# Patient Record
Sex: FEMALE | Race: WHITE | NOT HISPANIC OR LATINO | Employment: OTHER | URBAN - METROPOLITAN AREA
[De-identification: names, ages, dates, MRNs, and addresses within clinical notes are randomized per-mention and may not be internally consistent; named-entity substitution may affect disease eponyms.]

---

## 2017-01-01 ENCOUNTER — TRANSFERRED RECORDS (OUTPATIENT)
Dept: HEALTH INFORMATION MANAGEMENT | Facility: CLINIC | Age: 82
End: 2017-01-01

## 2017-01-01 ENCOUNTER — NURSING HOME VISIT (OUTPATIENT)
Dept: GERIATRICS | Facility: CLINIC | Age: 82
End: 2017-01-01
Payer: COMMERCIAL

## 2017-01-01 ENCOUNTER — CLINICAL UPDATE (OUTPATIENT)
Dept: PHARMACY | Facility: CLINIC | Age: 82
End: 2017-01-01

## 2017-01-01 ENCOUNTER — DOCUMENTATION ONLY (OUTPATIENT)
Dept: OTHER | Facility: CLINIC | Age: 82
End: 2017-01-01

## 2017-01-01 ENCOUNTER — TELEPHONE (OUTPATIENT)
Dept: GERIATRICS | Facility: CLINIC | Age: 82
End: 2017-01-01

## 2017-01-01 VITALS
OXYGEN SATURATION: 94 % | RESPIRATION RATE: 18 BRPM | DIASTOLIC BLOOD PRESSURE: 67 MMHG | HEART RATE: 65 BPM | BODY MASS INDEX: 27.64 KG/M2 | WEIGHT: 161 LBS | TEMPERATURE: 97.8 F | SYSTOLIC BLOOD PRESSURE: 108 MMHG

## 2017-01-01 VITALS
RESPIRATION RATE: 16 BRPM | WEIGHT: 158 LBS | BODY MASS INDEX: 27.12 KG/M2 | OXYGEN SATURATION: 98 % | SYSTOLIC BLOOD PRESSURE: 133 MMHG | DIASTOLIC BLOOD PRESSURE: 69 MMHG | TEMPERATURE: 98 F | HEART RATE: 70 BPM

## 2017-01-01 VITALS
WEIGHT: 160 LBS | BODY MASS INDEX: 27.46 KG/M2 | HEART RATE: 68 BPM | DIASTOLIC BLOOD PRESSURE: 86 MMHG | RESPIRATION RATE: 16 BRPM | TEMPERATURE: 98 F | OXYGEN SATURATION: 98 % | SYSTOLIC BLOOD PRESSURE: 134 MMHG

## 2017-01-01 VITALS
BODY MASS INDEX: 27.29 KG/M2 | RESPIRATION RATE: 18 BRPM | TEMPERATURE: 97.3 F | WEIGHT: 159 LBS | HEART RATE: 91 BPM | OXYGEN SATURATION: 96 % | DIASTOLIC BLOOD PRESSURE: 73 MMHG | SYSTOLIC BLOOD PRESSURE: 110 MMHG

## 2017-01-01 VITALS
HEART RATE: 76 BPM | BODY MASS INDEX: 26.95 KG/M2 | WEIGHT: 157 LBS | RESPIRATION RATE: 18 BRPM | SYSTOLIC BLOOD PRESSURE: 111 MMHG | TEMPERATURE: 97.6 F | DIASTOLIC BLOOD PRESSURE: 71 MMHG | OXYGEN SATURATION: 97 %

## 2017-01-01 VITALS
WEIGHT: 159 LBS | DIASTOLIC BLOOD PRESSURE: 69 MMHG | TEMPERATURE: 98 F | HEART RATE: 73 BPM | BODY MASS INDEX: 27.29 KG/M2 | OXYGEN SATURATION: 98 % | SYSTOLIC BLOOD PRESSURE: 133 MMHG | RESPIRATION RATE: 16 BRPM

## 2017-01-01 VITALS
BODY MASS INDEX: 26.78 KG/M2 | OXYGEN SATURATION: 96 % | DIASTOLIC BLOOD PRESSURE: 66 MMHG | TEMPERATURE: 97.8 F | RESPIRATION RATE: 16 BRPM | HEART RATE: 71 BPM | SYSTOLIC BLOOD PRESSURE: 144 MMHG | WEIGHT: 156 LBS

## 2017-01-01 VITALS
HEART RATE: 66 BPM | DIASTOLIC BLOOD PRESSURE: 69 MMHG | WEIGHT: 157 LBS | TEMPERATURE: 97.3 F | BODY MASS INDEX: 26.8 KG/M2 | OXYGEN SATURATION: 98 % | SYSTOLIC BLOOD PRESSURE: 112 MMHG | HEIGHT: 64 IN | RESPIRATION RATE: 20 BRPM

## 2017-01-01 VITALS
HEART RATE: 53 BPM | BODY MASS INDEX: 26.78 KG/M2 | DIASTOLIC BLOOD PRESSURE: 74 MMHG | TEMPERATURE: 97.5 F | OXYGEN SATURATION: 93 % | RESPIRATION RATE: 16 BRPM | WEIGHT: 156 LBS | SYSTOLIC BLOOD PRESSURE: 122 MMHG

## 2017-01-01 VITALS
HEART RATE: 65 BPM | RESPIRATION RATE: 18 BRPM | SYSTOLIC BLOOD PRESSURE: 108 MMHG | OXYGEN SATURATION: 94 % | BODY MASS INDEX: 27.64 KG/M2 | WEIGHT: 161 LBS | TEMPERATURE: 97.8 F | DIASTOLIC BLOOD PRESSURE: 67 MMHG

## 2017-01-01 VITALS
OXYGEN SATURATION: 96 % | BODY MASS INDEX: 26.76 KG/M2 | HEART RATE: 70 BPM | DIASTOLIC BLOOD PRESSURE: 64 MMHG | TEMPERATURE: 97.6 F | SYSTOLIC BLOOD PRESSURE: 164 MMHG | RESPIRATION RATE: 18 BRPM | WEIGHT: 156 LBS

## 2017-01-01 VITALS
TEMPERATURE: 97.6 F | DIASTOLIC BLOOD PRESSURE: 64 MMHG | OXYGEN SATURATION: 96 % | RESPIRATION RATE: 18 BRPM | WEIGHT: 158 LBS | HEART RATE: 70 BPM | SYSTOLIC BLOOD PRESSURE: 154 MMHG | BODY MASS INDEX: 27.12 KG/M2

## 2017-01-01 VITALS
TEMPERATURE: 97.7 F | SYSTOLIC BLOOD PRESSURE: 145 MMHG | OXYGEN SATURATION: 95 % | DIASTOLIC BLOOD PRESSURE: 65 MMHG | RESPIRATION RATE: 18 BRPM | BODY MASS INDEX: 27.46 KG/M2 | HEART RATE: 79 BPM | WEIGHT: 160 LBS

## 2017-01-01 DIAGNOSIS — R54 FRAIL ELDERLY: ICD-10-CM

## 2017-01-01 DIAGNOSIS — F02.80 ALZHEIMER'S DISEASE OF OTHER ONSET: ICD-10-CM

## 2017-01-01 DIAGNOSIS — H91.93 HOH (HARD OF HEARING), BILATERAL: ICD-10-CM

## 2017-01-01 DIAGNOSIS — H54.7 BLIND: ICD-10-CM

## 2017-01-01 DIAGNOSIS — H54.7 BLIND: Primary | ICD-10-CM

## 2017-01-01 DIAGNOSIS — I10 ESSENTIAL HYPERTENSION: ICD-10-CM

## 2017-01-01 DIAGNOSIS — G30.9 ALZHEIMER'S DEMENTIA WITHOUT BEHAVIORAL DISTURBANCE, UNSPECIFIED TIMING OF DEMENTIA ONSET: Primary | ICD-10-CM

## 2017-01-01 DIAGNOSIS — I10 BENIGN ESSENTIAL HYPERTENSION: ICD-10-CM

## 2017-01-01 DIAGNOSIS — I48.91 ATRIAL FIBRILLATION, UNSPECIFIED TYPE (H): Primary | ICD-10-CM

## 2017-01-01 DIAGNOSIS — I20.9 ANGINA PECTORIS (H): ICD-10-CM

## 2017-01-01 DIAGNOSIS — R42 DIZZINESS: ICD-10-CM

## 2017-01-01 DIAGNOSIS — Z79.01 CHRONIC ANTICOAGULATION: ICD-10-CM

## 2017-01-01 DIAGNOSIS — Z79.01 LONG TERM (CURRENT) USE OF ANTICOAGULANTS: ICD-10-CM

## 2017-01-01 DIAGNOSIS — M62.81 GENERALIZED MUSCLE WEAKNESS: ICD-10-CM

## 2017-01-01 DIAGNOSIS — Z71.89 ADVANCED DIRECTIVES, COUNSELING/DISCUSSION: Chronic | ICD-10-CM

## 2017-01-01 DIAGNOSIS — M25.551 PAIN OF RIGHT HIP JOINT: ICD-10-CM

## 2017-01-01 DIAGNOSIS — B37.2 YEAST INFECTION OF THE SKIN: Primary | ICD-10-CM

## 2017-01-01 DIAGNOSIS — I48.91 ATRIAL FIBRILLATION, UNSPECIFIED TYPE (H): ICD-10-CM

## 2017-01-01 DIAGNOSIS — N62 HYPERTROPHY OF BREAST: ICD-10-CM

## 2017-01-01 DIAGNOSIS — F41.9 ANXIETY: ICD-10-CM

## 2017-01-01 DIAGNOSIS — F02.80 ALZHEIMER'S DEMENTIA WITHOUT BEHAVIORAL DISTURBANCE, UNSPECIFIED TIMING OF DEMENTIA ONSET: ICD-10-CM

## 2017-01-01 DIAGNOSIS — I48.20 CHRONIC ATRIAL FIBRILLATION (H): Primary | ICD-10-CM

## 2017-01-01 DIAGNOSIS — G30.8 ALZHEIMER'S DISEASE OF OTHER ONSET: ICD-10-CM

## 2017-01-01 DIAGNOSIS — G30.9 ALZHEIMER'S DEMENTIA WITHOUT BEHAVIORAL DISTURBANCE, UNSPECIFIED TIMING OF DEMENTIA ONSET: ICD-10-CM

## 2017-01-01 DIAGNOSIS — N62 HYPERTROPHY OF BREAST: Primary | ICD-10-CM

## 2017-01-01 DIAGNOSIS — H25.10 SENILE NUCLEAR SCLEROSIS, UNSPECIFIED LATERALITY: ICD-10-CM

## 2017-01-01 DIAGNOSIS — F02.80 ALZHEIMER'S DEMENTIA WITHOUT BEHAVIORAL DISTURBANCE, UNSPECIFIED TIMING OF DEMENTIA ONSET: Primary | ICD-10-CM

## 2017-01-01 DIAGNOSIS — R53.1 WEAKNESS: ICD-10-CM

## 2017-01-01 DIAGNOSIS — M79.89 SWELLING OF LIMB: ICD-10-CM

## 2017-01-01 DIAGNOSIS — I48.20 CHRONIC ATRIAL FIBRILLATION (H): ICD-10-CM

## 2017-01-01 LAB
ANION GAP SERPL CALCULATED.3IONS-SCNC: 11 MMOL/L (ref 5–18)
BUN SERPL-MCNC: 12 MG/DL (ref 8–28)
CALCIUM SERPL-MCNC: 9 MG/DL (ref 8.5–10.5)
CHLORIDE SERPLBLD-SCNC: 104 MMOL/L (ref 98–107)
CO2 SERPL-SCNC: 26 MMOL/L (ref 22–31)
CREAT SERPL-MCNC: 0.86 MG/DL (ref 0.6–1.1)
GFR SERPL CREATININE-BSD FRML MDRD: >60 ML/MIN/1.73M2
GLUCOSE SERPL-MCNC: 77 MG/DL (ref 70–125)
HEMOGLOBIN: 13.4 G/DL (ref 12–16)
HEMOGLOBIN: 13.8 G/DL (ref 12–16)
INR PPP: 1.96 (ref 0.9–1.1)
INR PPP: 2.47 (ref 0.9–1.1)
INR PPP: 2.62 (ref 0.9–1.1)
INR PPP: 2.62 (ref 0.9–1.1)
POTASSIUM SERPL-SCNC: 3.9 MMOL/L (ref 3.5–5)
SODIUM SERPL-SCNC: 141 MMOL/L (ref 136–145)
TSH SERPL-ACNC: 0.3 UIU/ML (ref 0.3–5)

## 2017-01-01 PROCEDURE — 99309 SBSQ NF CARE MODERATE MDM 30: CPT | Performed by: NURSE PRACTITIONER

## 2017-01-01 PROCEDURE — 99207 ZZC CDG-CORRECTLY CODED, REVIEWED AND AGREE: CPT | Performed by: FAMILY MEDICINE

## 2017-01-01 PROCEDURE — 99207 ZZC CDG-CORRECTLY CODED, REVIEWED AND AGREE: CPT | Performed by: NURSE PRACTITIONER

## 2017-01-01 PROCEDURE — 99310 SBSQ NF CARE HIGH MDM 45: CPT | Performed by: FAMILY MEDICINE

## 2017-01-01 PROCEDURE — 99308 SBSQ NF CARE LOW MDM 20: CPT | Performed by: NURSE PRACTITIONER

## 2017-01-01 PROCEDURE — 99309 SBSQ NF CARE MODERATE MDM 30: CPT | Performed by: FAMILY MEDICINE

## 2017-01-01 PROCEDURE — 99318 ZZC ANNUAL NURSING FAC ASSESSMNT, STABLE: CPT | Performed by: NURSE PRACTITIONER

## 2017-01-01 RX ORDER — NYSTATIN 100000 [USP'U]/G
POWDER TOPICAL 2 TIMES DAILY PRN
Qty: 60 G | Refills: 5 | Status: SHIPPED | OUTPATIENT
Start: 2017-01-01 | End: 2018-01-01

## 2017-01-01 RX ORDER — BRIMONIDINE TARTRATE 2 MG/ML
1 SOLUTION/ DROPS OPHTHALMIC 2 TIMES DAILY
Qty: 1 BOTTLE | Refills: 11 | Status: SHIPPED | OUTPATIENT
Start: 2017-01-01

## 2017-01-01 RX ORDER — OLOPATADINE HYDROCHLORIDE 1 MG/ML
1 SOLUTION/ DROPS OPHTHALMIC 2 TIMES DAILY PRN
COMMUNITY

## 2017-01-05 VITALS
OXYGEN SATURATION: 98 % | WEIGHT: 159 LBS | DIASTOLIC BLOOD PRESSURE: 79 MMHG | SYSTOLIC BLOOD PRESSURE: 161 MMHG | BODY MASS INDEX: 27.28 KG/M2 | TEMPERATURE: 97.7 F | RESPIRATION RATE: 18 BRPM | HEART RATE: 60 BPM

## 2017-01-05 NOTE — PROGRESS NOTES
[place] [facility]    Chief Complaint   Patient presents with     [facility]I Regulatory       HPI:   Obtained from the patient, medical record and from the [facility] staffs.     Francisco Mason is a 89 year old  (6/18/1920), who is being seen today for a federally mandated E/M visit at [facility] . Today's concerns are:  Atrial fibrillation, unspecified type (H)  - Denies CP, SOB or palpitation  - Had CP episode las month, family and pt opted for conservative management at the NH, started on NTG prn, with good result. .       Benign essential hypertension  - No CP, HA or fainting      Osteoarthritis, unspecified osteoarthritis type, unspecified site  - Reports aching chronic pain in right hip, 7/10 when severe, aggravated with excessive movements, but better with rest and pain medications.   - Transfer self to  and a walker.    Alzheimer's disease of other onset  - has a private room. No  Behavioral issue reported.   - Moderate, and is able to make some needs known. Some care cares must be anticipated        ALLERGIES: Iodine; Levaquin; Penicillins; Plasticized base; and Sulfa drugs  PAST MEDICAL HISTORY:  has a past medical history of Memory loss (6/2/2014); Unsteady gait (6/2/2014); A-fib (H); Unspecified essential hypertension; Weakness; Falls; Cataract; Vision problems; and Puncture wound of ear drum, right (1/19/2015).  PAST SURGICAL HISTORY:  has past surgical history that includes Eye surgery and Phacoemulsification with standard intraocular lens implant (Right, 10/6/2014).  FAMILY HISTORY: family history is not on file.  SOCIAL HISTORY:  reports that she has never smoked. She has never used smokeless tobacco. She reports that she does not drink alcohol or use illicit drugs.    MEDICATIONS:  Current Outpatient Prescriptions   Medication Sig Dispense Refill     warfarin (COUMADIN) 1 MG tablet Take 1 mg by mouth As directed per INR       phenol (CHLORASEPTIC) 1.4 % LIQD Take 2 sprays  by mouth every 3 hours as needed for sore throat       nystatin (MYCOSTATIN) 086820 UNIT/GM POWD Apply topically 2 times daily as needed        Artificial Tear Ointment (LACRI-LUBE OP) Place 1 Application into the right eye At Bedtime       polyethylene glycol (MIRALAX/GLYCOLAX) packet Take 1 packet by mouth daily       LISINOPRIL PO Take 10 mg by mouth daily        VITAMIN D, CHOLECALCIFEROL, PO Take 1,000 Units by mouth daily       senna-docusate (SENOKOT-S;PERICOLACE) 8.6-50 MG per tablet Take 3 tablets by mouth At Bedtime        diltiazem (DILT-XR) 120 MG 24 hr ER capsule Take 120 mg by mouth daily       brimonidine (ALPHAGAN) 0.2 % ophthalmic solution Place 2 drops into the right eye 2 times daily        Acetaminophen (TYLENOL PO) Take 1,000 mg by mouth 2 times daily        Medications reviewed:  Medications reconciled to facility chart and changes were made to reflect current medications as identified as above med list. Below are the changes that were made:   Medications stopped since last EPIC medication reconciliation:   There are no discontinued medications.    Medications started since last [facility] medication reconciliation:  No orders of the defined types were placed in this encounter.         Case Management:  I have reviewed the care plan and MDS and do agree with the plan. Patient's desire to return to the community is not present.  Information reviewed:  Medications, vital signs, orders, and nursing notes.    ROS:  10 point ROS of systems including Constitutional, Eyes, Respiratory, Cardiovascular, Gastroenterology, Genitourinary, Integumentary, Muscularskeletal, Psychiatric were all negative except for pertinent positives noted in my HPI.    Exam:  /79 mmHg  Pulse 60  Temp(Src) 97.7  F (36.5  C)  Resp 18  Wt 159 lb (72.122 kg)  SpO2 98%    GENERAL APPEARANCE:  Alert, in no distress, thin  ENT:  Mouth and posterior oropharynx normal, moist mucous.[place] membranes, lost some teeth mainly on the  top level  EYES:  EOM, conjunctivae, lids, pupils and irises normal. LOST VISUAL ACUITY IN LEFT EYE.    NECK:  No adenopathy,masses or thyromegaly  RESP:  respiratory effort and palpation of chest normal, lungs clear to auscultation , no respiratory distress  CV:  Palpation and auscultation of heart done , irregular rate  no murmur, rub, or gallop, no edema  ABDOMEN:  normal bowel sounds, soft, nontender, no hepatosplenomegaly or other masses  M/S:   Gait and station abnormal, uses WC and a Rolator.   SKIN:  Inspection of skin and subcutaneous tissue baseline, Palpation of skin and subcutaneous tissue baseline  NEURO:   Cranial nerves 2-12 are normal tested and grossly at patient's baseline  PSYCH:  oriented to name, day of the week, not the year (missed by one year), and the month.  Judgement is intact, memory affected.        Lab/Diagnostic data:  All labs reviewed, none recent    ASSESSMENT/PLAN  Atrial fibrillation, unspecified type (H)  -  on coumadin with INR followed closely  - On Diltiazem  mg ER., CVR, clinically stable.     Benign essential hypertension   BP Readings from Last 3 Encounters:   01/05/17 161/79   12/05/16 124/72   11/05/16 132/67   -  On Lisinopril 10 mg daily, controlled.   - Keep SBP> 130 mmHg and DBP > 65 mmHg (levels below these increase mortality as shown by standard studies and observations).       Osteoarthritis, unspecified osteoarthritis type, unspecified site  - Chronic, analgesia optimal    Alzheimer's disease of other onset  - Continue to anticipate needs. Chronic condition, ongoing decline expected.   -  Continue to provide redirection and reassurance as needed. Maintain safe living situation with goals focused on comfort.    Frailty:  - Significant  Deficits requiring NH placement  Requiring extensive assistance from nursing  Up for meals only o/w spends the day resting in bed          Orders:  - The current medical regimen is effective;  continue present plan and  medications.      Total time spent with patient visit was 35 min including patient visit and review of past records..    Electronically signed by:  Erma Barrientos MD

## 2017-01-09 ENCOUNTER — NURSING HOME VISIT (OUTPATIENT)
Dept: GERIATRICS | Facility: CLINIC | Age: 82
End: 2017-01-09
Payer: COMMERCIAL

## 2017-01-09 DIAGNOSIS — I10 BENIGN ESSENTIAL HYPERTENSION: ICD-10-CM

## 2017-01-09 DIAGNOSIS — G30.8 ALZHEIMER'S DISEASE OF OTHER ONSET: ICD-10-CM

## 2017-01-09 DIAGNOSIS — R54 FRAIL ELDERLY: ICD-10-CM

## 2017-01-09 DIAGNOSIS — M19.90 OSTEOARTHRITIS, UNSPECIFIED OSTEOARTHRITIS TYPE, UNSPECIFIED SITE: ICD-10-CM

## 2017-01-09 DIAGNOSIS — I48.91 ATRIAL FIBRILLATION, UNSPECIFIED TYPE (H): Primary | ICD-10-CM

## 2017-01-09 DIAGNOSIS — F02.80 ALZHEIMER'S DISEASE OF OTHER ONSET: ICD-10-CM

## 2017-01-09 PROCEDURE — 99207 ZZC CDG-CORRECTLY CODED, REVIEWED AND AGREE: CPT | Performed by: FAMILY MEDICINE

## 2017-01-09 PROCEDURE — 99310 SBSQ NF CARE HIGH MDM 45: CPT | Performed by: FAMILY MEDICINE

## 2017-02-08 NOTE — PROGRESS NOTES
"[place] [facility]    Chief Complaint   Patient presents with     Nursing Home Acute       HPI:    Francisco Mason is a 89 year old  (6/18/1920), who is being seen today for an episodic care visit at [facility] . Today's concern is:  Atrial fibrillation, unspecified type (H)  On chronic coumadin   No CP/SOB, HA    Hip pain  Chronic but worse lately  Ambulated independently with walker previously now relies on w/c  Patient states \"you can't expect me to keep it all up.  I'm blind\"  Discussed hip pain with patient who states \"of course I hurt\"  Nursing believes the hip pain limits mobilty  Alzheimer's disease of other onset  With physical and functional decline and Significant deficits requiring NH placement  Complicates nursing care and patients ability to follow and understand POC.  Needs private room r/t aggression toward roommates   Anxiety  Managed with environmental changes.  Reclusive.  Worse lately r/t son being gone to [facility] (hard of hearing)   Limits communication.  Able to participate with conversation with quiet environment and 1:1 attention.  Blind  Followed by eye clinic   Limits independence  ALLERGIES: Iodine; Levaquin [levofloxacin]; Penicillins; Plasticized base [bht-mo-polyethylene]; and Sulfa drugs  Past Medical, Surgical, Family and Social History reviewed and updated in [facility].    Current Outpatient Prescriptions   Medication Sig Dispense Refill     Oseltamivir Phosphate (TAMIFLU PO) Take 75 mg by mouth daily       Acetaminophen (TYLENOL PO) Take 1,000 mg by mouth every 6 hours as needed for mild pain or fever       brimonidine (ALPHAGAN) 0.2 % ophthalmic solution Place 1 drop into the right eye 2 times daily 1 Bottle 11     warfarin (COUMADIN) 1 MG tablet Take 1 mg by mouth As directed per INR       phenol (CHLORASEPTIC) 1.4 % LIQD Take 2 sprays by mouth every 3 hours as needed for sore throat       nystatin (MYCOSTATIN) 025208 UNIT/GM POWD Apply topically 2 times " daily as needed        Artificial Tear Ointment (LACRI-LUBE OP) Place 1 Application into the right eye At Bedtime       polyethylene glycol (MIRALAX/GLYCOLAX) packet Take 1 packet by mouth daily       LISINOPRIL PO Take 10 mg by mouth daily        VITAMIN D, CHOLECALCIFEROL, PO Take 1,000 Units by mouth daily       senna-docusate (SENOKOT-S;PERICOLACE) 8.6-50 MG per tablet Take 3 tablets by mouth At Bedtime        diltiazem (DILT-XR) 120 MG 24 hr ER capsule Take 120 mg by mouth daily       Acetaminophen (TYLENOL PO) Take 1,000 mg by mouth 2 times daily        Medications reviewed:  Medications reconciled to facility chart and changes were made to reflect current medications as identified as above med list. Below are the changes that were made:   Medications stopped since last EPIC medication reconciliation:   There are no discontinued medications.    Medications started since last [facility] medication reconciliation:  Orders Placed This Encounter   Medications     Oseltamivir Phosphate (TAMIFLU PO)     Sig: Take 75 mg by mouth daily     Acetaminophen (TYLENOL PO)     Sig: Take 1,000 mg by mouth every 6 hours as needed for mild pain or fever         REVIEW OF SYSTEMS:  Unobtainable secondary to cognitive impairment or aphasia. and 4 point ROS including Respiratory, CV, GI and , other than that noted in the HPI,  is negative    Physical Exam:  /64  Pulse 70  Temp 97.6  F (36.4  C)  Resp 18  Wt 156 lb (70.8 kg)  SpO2 96%  BMI 26.78 kg/m2  GENERAL APPEARANCE:  Alert, in no distress, cooperative  ENT:  [place], oral mucous membranes moist  EYES:  EOM normal, conjunctiva and lids normal  RESP:  lungs clear to auscultation , no respiratory distress, diminished breath sounds bases  CV:  regular rate and rhythm, no murmur, rub, or gallop, no edema  ABDOMEN:  bowel sounds normal, soft, non-tender  M/S:   Gait and station abnormal up at side of bed at present HAYS thin frail kyphotic  SKIN:  pale w/d  PSYCH:  oriented to self,  insight and judgement impaired, memory impaired , affect and mood normal      INR 2.3 on 3 mg coumadin    Assessment/Plan:     Atrial fibrillation, unspecified type (H)  Hip pain  Alzheimer's disease of other onset  Anxiety  [place] (hard of hearing)  Blind      Orders:  Xray right hip  Continue same dose coumadin and check INR one month   Offer tylenol as patient allows    Time  Total time spent with patient visit was 25 min including patient visit and review of past records. Greater than 50% of total time spent with counseling and coordinating care.      Electronically signed by  EMILY Beltran CNP

## 2017-03-08 NOTE — PROGRESS NOTES
[place] [facility]    Chief Complaint   Patient presents with     [facility] Regulatory       HPI:    Francisco Mason is a 89 year old  (6/18/1920), who is being seen today for a federally mandated E/M visit at [facility] . Today's concerns are:  {S DX:629313}    ALLERGIES: Iodine; Levaquin [levofloxacin]; Penicillins; Plasticized base [bht-mo-polyethylene]; and Sulfa drugs  PAST MEDICAL HISTORY:  has a past medical history of A-fib (H); Cataract; Falls; Memory loss (6/2/2014); Puncture wound of ear drum, right (1/19/2015); Unspecified essential hypertension; Unsteady gait (6/2/2014); Vision problems; and Weakness.  PAST SURGICAL HISTORY:  has a past surgical history that includes Eye surgery and Phacoemulsification with standard intraocular lens implant (Right, 10/6/2014).  FAMILY HISTORY: family history is not on file.  SOCIAL HISTORY:  reports that she has never smoked. She has never used smokeless tobacco. She reports that she does not drink alcohol or use illicit drugs.    MEDICATIONS:  Current Outpatient Prescriptions   Medication Sig Dispense Refill     Acetaminophen (TYLENOL PO) Take 1,000 mg by mouth every 6 hours as needed for mild pain or fever       brimonidine (ALPHAGAN) 0.2 % ophthalmic solution Place 1 drop into the right eye 2 times daily 1 Bottle 11     warfarin (COUMADIN) 1 MG tablet Take 1 mg by mouth As directed per INR       phenol (CHLORASEPTIC) 1.4 % LIQD Take 2 sprays by mouth every 3 hours as needed for sore throat       nystatin (MYCOSTATIN) 202700 UNIT/GM POWD Apply topically 2 times daily as needed        Artificial Tear Ointment (LACRI-LUBE OP) Place 1 Application into the right eye At Bedtime       polyethylene glycol (MIRALAX/GLYCOLAX) packet Take 1 packet by mouth daily       LISINOPRIL PO Take 10 mg by mouth daily        VITAMIN D, CHOLECALCIFEROL, PO Take 1,000 Units by mouth daily       senna-docusate (SENOKOT-S;PERICOLACE) 8.6-50 MG per tablet Take 3  tablets by mouth At Bedtime        diltiazem (DILT-XR) 120 MG 24 hr ER capsule Take 120 mg by mouth daily       Acetaminophen (TYLENOL PO) Take 1,000 mg by mouth 2 times daily        Medications reviewed:  Medications reconciled to facility chart and changes were made to reflect current medications as identified as above med list. Below are the changes that were made:   Medications stopped since last EPIC medication reconciliation:   There are no discontinued medications.    Medications started since last [facility] medication reconciliation:  No orders of the defined types were placed in this encounter.        Case Management:  I have reviewed the care plan and MDS and do agree with the plan. Patient's desire to return to the community is {FGS RETURN TO COMMUNITY:610597}.  Information reviewed:  Medications, vital signs, orders, and nursing notes.    ROS:  {ROS FGS:126764}    Exam:  /64  Pulse 70  Temp 97.6  F (36.4  C)  Resp 18  Wt 158 lb (71.7 kg)  SpO2 96%  BMI 27.12 kg/m2  {Nursing home physical exam :601730}    Lab/Diagnostic data:  All labs reviewed, none recent  ASSESSMENT/PLAN  {FGS DX:619035}    Orders:  ***    Total time spent with patient visit was {1/2/3/4/5:350969} including patient visit and review of past records.Greater than 50% of total time spent with counseling and coordinating care.    Electronically signed by:  Eleanor Mckeon

## 2017-03-09 NOTE — PROGRESS NOTES
"[place] [facility]    Chief Complaint   Patient presents with     care home Regulatory       HPI:    Francisco Mason is a 89 year old  (6/18/1920), who is being seen today for a Federally Mandated Regulatory care visit at [facility] . Today's concern is:   Alzheimer's disease of other onset  With physical and functional decline and Significant deficits requiring NH placement   Goal is to emphasize comfort with conservative treatment in NH  Afib  On chronic coumadin.  On Diltiazem with good rate control.  No CP/SOB, HA.   INR therapeutic   Unsteady gait  Ambulates independently with walker  Hx of recurring falls some with injury or fracture   Participates with therapy on prn basis    Benign essential hypertension  Somewhat elevated  per chart and nursing  Discussed with staff and patient .  Patient states \"I don't know what pills I take but I don't want too many\".   BP Readings from Last 3 Encounters:   03/08/17 154/64   02/08/17 164/64   01/05/17 161/79      ALLERGIES: Iodine; Levaquin [levofloxacin]; Penicillins; Plasticized base [bht-mo-polyethylene]; and Sulfa drugs  Past Medical, Surgical, Family and Social History reviewed and updated in "[employer]    Current Outpatient Prescriptions   Medication Sig Dispense Refill     Acetaminophen (TYLENOL PO) Take 1,000 mg by mouth every 6 hours as needed for mild pain or fever       brimonidine (ALPHAGAN) 0.2 % ophthalmic solution Place 1 drop into the right eye 2 times daily 1 Bottle 11     warfarin (COUMADIN) 1 MG tablet Take 1 mg by mouth As directed per INR       phenol (CHLORASEPTIC) 1.4 % LIQD Take 2 sprays by mouth every 3 hours as needed for sore throat       nystatin (MYCOSTATIN) 713895 UNIT/GM POWD Apply topically 2 times daily as needed        Artificial Tear Ointment (LACRI-LUBE OP) Place 1 Application into the right eye At Bedtime       polyethylene glycol (MIRALAX/GLYCOLAX) packet Take 1 packet by mouth daily       LISINOPRIL PO Take 10 " mg by mouth daily        VITAMIN D, CHOLECALCIFEROL, PO Take 1,000 Units by mouth daily       senna-docusate (SENOKOT-S;PERICOLACE) 8.6-50 MG per tablet Take 3 tablets by mouth At Bedtime        diltiazem (DILT-XR) 120 MG 24 hr ER capsule Take 120 mg by mouth daily       Acetaminophen (TYLENOL PO) Take 1,000 mg by mouth 2 times daily        Case Management:  I have reviewed the care plan and MDS and do agree with the plan. Patient's desire to return to the community is present, but is not able due to care needs .  Information reviewed:  Medications, vital signs, orders, and nursing note    REVIEW OF SYSTEMS:  Unobtainable secondary to cognitive impairment or aphasia and 4 point ROS including Respiratory, CV, GI and , other than that noted in the HPI,  is negative    Physical Exam:  Vitals: /64  Pulse 70  Temp 97.6  F (36.4  C)  Resp 18  Wt 158 lb (71.7 kg)  SpO2 96%  BMI 27.12 kg/m2  BMI= Body mass index is 27.12 kg/(m^2).    GENERAL APPEARANCE:  Alert, in no distress, very pleasant and conversant  ENT: , moist mucous membranes, hearing acuity normal  EYES:  EOM, conjunctivae, lids normal  No c/o blurred vision at present   RESP:  respiratory effort of chest normal, no respiratory distress, Lung sounds clear  CV:  auscultation of heart done , rate and rhythm irreg, no  murmur, no rub or gallop, Edema trace  ABDOMEN:  normal bowel sounds, soft, nontender,  M/S:   Gait and station unsteady, good ROM    SKIN:  Pale w/d   PSYCH:  insight and judgement, memory impaired , affect and mood normal    Recent Labs:    Hemoglobin   Date Value Ref Range Status   09/09/2016 13.1 11.7 - 15.7 g/dL Final     Last Basic Metabolic Panel:  NA      141   3/1/2016   POTASSIUM      3.8   3/1/2016  CHLORIDE      104   3/1/2016  DARIELA      9.1   3/1/2016  CO2       28   3/1/2016  BUN       12   3/1/2016  CR     0.83   3/1/2016  GLC       71   3/1/2016     Assessment/Plan:  1. Atrial fibrillation, unspecified type (H)    2. Alzheimer's  disease of other onset    3. Generalized muscle weakness    4. Benign essential hypertension       Orders:    Add lisinopril 2.5 mg po qd o/w the current medical regimen is effective;  continue present plan and medications.    Time  Total time spent with patient visit was 25 min including patient visit, review of past records and phone call to son/Ej Mason/ patient contact. Greater than 50% of total time spent with counseling and coordinating care.    Electronically signed by  EMILY Beltran CNP

## 2017-04-17 NOTE — PROGRESS NOTES
[place] [facility]    Chief Complaint   Patient presents with     Nursing Home Acute       HPI:    Francisco Mason is a 89 year old  (6/18/1920), who is being seen today for an episodic care visit at [facility] . Today's concern is:  Atrial fibrillation, unspecified type (H)  On chronic coumadin  Denies CP/SOB, HA   HR stable  Senile nuclear sclerosis  Blind  Followed by Dr Cormier  Limits activity, independence  Has lacrilube ordered but states she doesn't want it   [place] (hard of hearing), bilateral   Limits communication.  Able to participate with conversation with quiet environment and 1:1 attention.  Alzheimer's disease of other onset  With physical and functional decline and Significant deficits requiring NH placement   Requires private room r/t inability to adjust to any roommate    ALLERGIES: Iodine; Levaquin [levofloxacin]; Penicillins; Plasticized base [bht-mo-polyethylene]; and Sulfa drugs  Past Medical, Surgical, Family and Social History reviewed and updated in [facility].    Current Outpatient Prescriptions   Medication Sig Dispense Refill     nystatin (MYCOSTATIN) 029711 UNIT/GM POWD Apply topically 2 times daily as needed 60 g 5     Acetaminophen (TYLENOL PO) Take 1,000 mg by mouth every 6 hours as needed for mild pain or fever       brimonidine (ALPHAGAN) 0.2 % ophthalmic solution Place 1 drop into the right eye 2 times daily 1 Bottle 11     warfarin (COUMADIN) 1 MG tablet Take 1 mg by mouth As directed per INR       phenol (CHLORASEPTIC) 1.4 % LIQD Take 2 sprays by mouth every 3 hours as needed for sore throat       polyethylene glycol (MIRALAX/GLYCOLAX) packet Take 1 packet by mouth daily       LISINOPRIL PO Take 10 mg by mouth daily        VITAMIN D, CHOLECALCIFEROL, PO Take 1,000 Units by mouth daily       senna-docusate (SENOKOT-S;PERICOLACE) 8.6-50 MG per tablet Take 3 tablets by mouth At Bedtime        diltiazem (DILT-XR) 120 MG 24 hr ER capsule Take 120 mg by mouth daily        Acetaminophen (TYLENOL PO) Take 1,000 mg by mouth 2 times daily        Medications reviewed:  Medications reconciled to facility chart and changes were made to reflect current medications as identified as above med list. Below are the changes that were made:   Medications stopped since last EPIC medication reconciliation:   There are no discontinued medications.    Medications started since last [facility] medication reconciliation:  No orders of the defined types were placed in this encounter.        REVIEW OF SYSTEMS:  4 point ROS including Respiratory, CV, GI and , other than that noted in the HPI,  is negative    Physical Exam:  /86  Pulse 68  Temp 98  F (36.7  C)  Resp 16  Wt 160 lb (72.6 kg)  SpO2 98%  BMI 27.46 kg/m2  GENERAL APPEARANCE:  Alert, in no distress, very pleasant and conversant  ENT: , moist mucous membranes, hearing acuity normal  EYES:  EOM, conjunctivae, lids normal  No c/o blurred vision at present   RESP:  respiratory effort of chest normal, no respiratory distress, Lung sounds clear  CV:  auscultation of heart done , rate and rhythm irreg, no  murmur, no rub or gallop, Edema trace  ABDOMEN:  normal bowel sounds, soft, nontender,  M/S:   Gait and station unsteady, good ROM    SKIN:  Pale w/d   PSYCH:  insight and judgement, memory impaired , affect and mood normal      Recent Labs:     Results for orders placed or performed in visit on 11/03/16   INR   Result Value Ref Range    PT  Seconds    INR 2.17    INR   Result Value Ref Range    PT  Seconds    INR 1.68    INR   Result Value Ref Range    PT  Seconds    INR 2.55    INR   Result Value Ref Range    PT  Seconds    INR 3.36    Hemoglobin   Result Value Ref Range    Hemoglobin 13.1 11.7 - 15.7 g/dL     Last Basic Metabolic Panel:  Lab Results   Component Value Date     03/01/2016      Lab Results   Component Value Date    POTASSIUM 3.8 03/01/2016     Lab Results   Component Value Date    CHLORIDE 104 03/01/2016     Lab  Results   Component Value Date    DARIELA 9.1 03/01/2016     Lab Results   Component Value Date    CO2 28 03/01/2016     Lab Results   Component Value Date    BUN 12 03/01/2016     Lab Results   Component Value Date    CR 0.83 03/01/2016     Lab Results   Component Value Date    GLC 71 03/01/2016       Assessment/Plan:     Atrial fibrillation, unspecified type (H)  Senile nuclear sclerosis  Blind  [place] (hard of hearing), bilateral  Alzheimer's disease of other onset      Orders:  1. DC lacrilube    Time  Total time spent with patient visit was 25 min including patient visit and review of past records. Greater than 50% of total time spent with counseling and coordinating care .       Electronically signed by  EMILY Beltran CNP

## 2017-05-04 NOTE — PROGRESS NOTES
[place] [facility]    Chief Complaint   Patient presents with     residential Regulatory       HPI:   Obtained from the patient, medical record and from the medial staffs.     Francisco Mason is a 89 year old  (6/18/1920), who is being seen today for a federally mandated E/M visit at [facility] . Today's concerns are:  Atrial fibrillation, unspecified type (H)  - Denies CP, SOB or palpitation    Benign essential hypertension  - No CP, HA or fainting      Frail elderly  - continues to requires assistance with ADLs  - Uses a cane  For ambulation, no fall reported by nursing staff    [place] (hard of hearing), bilateral  -  Limits communication.  Able to participate with conversation with quiet environment and 1:1 attention.      Senile nuclear sclerosis, unspecified laterality  - Sees Dr. Cormier.   - Legally blind in left eye. Had a cataract removed from R eye in Oct 2014. Reports ongoing intermittent blurry vision in R eye, s/p posterior capsule opacity Laser surgery.  - issue with the newest eye meds which is not covered by the State.     Alzheimer's disease of other onset  - Able to make some needs known. Some care cares must be anticipated    ________________________________________________  # Past Medical, social, family histories, medications, and allergies reviewed and updated    MEDICATIONS:  Current Outpatient Prescriptions   Medication Sig Dispense Refill     nystatin (MYCOSTATIN) 591826 UNIT/GM POWD Apply topically 2 times daily as needed 60 g 5     Acetaminophen (TYLENOL PO) Take 1,000 mg by mouth every 6 hours as needed for mild pain or fever       brimonidine (ALPHAGAN) 0.2 % ophthalmic solution Place 1 drop into the right eye 2 times daily 1 Bottle 11     warfarin (COUMADIN) 1 MG tablet Take 1 mg by mouth As directed per INR       phenol (CHLORASEPTIC) 1.4 % LIQD Take 2 sprays by mouth every 3 hours as needed for sore throat       polyethylene glycol (MIRALAX/GLYCOLAX) packet  Take 1 packet by mouth daily       LISINOPRIL PO Take 10 mg by mouth daily        VITAMIN D, CHOLECALCIFEROL, PO Take 1,000 Units by mouth daily       senna-docusate (SENOKOT-S;PERICOLACE) 8.6-50 MG per tablet Take 3 tablets by mouth At Bedtime        diltiazem (DILT-XR) 120 MG 24 hr ER capsule Take 120 mg by mouth daily       Acetaminophen (TYLENOL PO) Take 1,000 mg by mouth 2 times daily        Medications reviewed: Reviewed in the chart and EHR.      Case Management:  I have reviewed the care plan and MDS and do agree with the plan. Patient's desire to return to the community is not present.  Information reviewed:  Medications, vital signs, orders, and nursing notes.    ROS:  10 point ROS of systems including Constitutional, Eyes, Respiratory, Cardiovascular, Gastroenterology, Genitourinary, Integumentary, Muscularskeletal, Psychiatric were all negative except for pertinent positives noted in my HPI.    Exam:  Vitals: /69  Pulse 73  Temp 98  F (36.7  C)  Resp 16  Wt 159 lb (72.1 kg)  SpO2 98%  BMI 27.29 kg/m2  BMI= Body mass index is 27.29 kg/(m^2).  GENERAL APPEARANCE:  Alert, in no distress, thin  ENT:  Mouth and posterior oropharynx normal, moist mucous.[place] membranes, lost some teeth mainly on the top level  EYES:  EOM, conjunctivae, lids, pupils and irises normal. LOST VISUAL ACUITY IN LEFT EYE.    NECK:  No adenopathy,masses or thyromegaly  RESP:  respiratory effort and palpation of chest normal, lungs clear to auscultation , no respiratory distress  CV:  Palpation and auscultation of heart done , irregular rate  no murmur, rub, or gallop, no edema  ABDOMEN:  normal bowel sounds, soft, nontender, no hepatosplenomegaly or other masses  M/S:   Gait and station abnormal, uses WC and a cane. Good hand . DIP and PIP nodules noted in both hands.  SKIN:  Inspection of skin and subcutaneous tissue baseline, Palpation of skin and subcutaneous tissue baseline  NEURO:   Cranial nerves 2-12 are normal  tested and grossly at patient's baseline  PSYCH:  AAOx Person, and time  But not place. Judgement is intact, memory affected.      Lab/Diagnostic data:  All labs reviewed, none recent    ASSESSMENT/PLAN  Atrial fibrillation, unspecified type (H)  - on coumadin with INR followed closely  - on diltiazem for rate control    Benign essential hypertension  BP Readings from Last 3 Encounters:   05/04/17 133/69   04/17/17 134/86   03/08/17 154/64   - On lisinopril 10 mg. Also on diltiazem  - Keep SBP> 130 mmHg and DBP > 65 mmHg (levels below these increase mortality as shown by standard studies and observations). Permit SBP up to 160 mmHg given limited age expectance.   - Check BMP ( on lisinopril)    Frail elderly  - Significant  Deficits requiring NH placement. Requiring extensive assistance from nursing. Up for meals only o/w spends the day resting in bed      [place] (hard of hearing), bilateral  -  Limits communication.  Able to participate with conversation with quiet environment and 1:1 attention.      Senile nuclear sclerosis, unspecified laterality  - follows on Dr. Cormier's recommendations.     Alzheimer's disease of other onset  - Continue to anticipate needs. Chronic condition, ongoing decline expected.   -  Continue to provide redirection and reassurance as needed. Maintain safe living situation with goals focused on comfort.    Suggestions:  -  See above.     Total time spent with patient visit at the skilled nursing facility was 35 min including patient visit, discuss with GNP and nursing staffs, review medical records since the Writer's last visit, and labs results.       Electronically signed by:  Erma Barrientos MD

## 2017-05-31 NOTE — PROGRESS NOTES
[place] [facility]    Chief Complaint   Patient presents with     Nursing Home Acute       HPI:    Francisco Mason is a 89 year old  (6/18/1920), who is being seen today for an episodic care visit at [facility] .  HPI information obtained from: facility chart records and facility staff.Today's concern is:  Atrial fibrillation, unspecified type (H)  On chronic coumadin  On diltiazem with good HR control.  Dizziness  Benign essential hypertension  Patient has recurring HTN episodes some with BP Rangng 200s/ 100s   Last episode x 2 this week with patient c/o feeling ill and dizzy with episodes.  No CP/SOB, HA.  Basically blind       ALLERGIES: Iodine; Levaquin [levofloxacin]; Penicillins; Plasticized base [bht-mo-polyethylene]; and Sulfa drugs  Past Medical, Surgical, Family and Social History reviewed and updated in EPIC.    Current Outpatient Prescriptions   Medication Sig Dispense Refill     nystatin (MYCOSTATIN) 641021 UNIT/GM POWD Apply topically 2 times daily as needed 60 g 5     Acetaminophen (TYLENOL PO) Take 1,000 mg by mouth every 6 hours as needed for mild pain or fever       brimonidine (ALPHAGAN) 0.2 % ophthalmic solution Place 1 drop into the right eye 2 times daily 1 Bottle 11     warfarin (COUMADIN) 1 MG tablet Take 1 mg by mouth As directed per INR       phenol (CHLORASEPTIC) 1.4 % LIQD Take 2 sprays by mouth every 3 hours as needed for sore throat       polyethylene glycol (MIRALAX/GLYCOLAX) packet Take 1 packet by mouth daily       LISINOPRIL PO Take 10 mg by mouth daily        VITAMIN D, CHOLECALCIFEROL, PO Take 1,000 Units by mouth daily       senna-docusate (SENOKOT-S;PERICOLACE) 8.6-50 MG per tablet Take 3 tablets by mouth At Bedtime        diltiazem (DILT-XR) 120 MG 24 hr ER capsule Take 120 mg by mouth daily       Acetaminophen (TYLENOL PO) Take 1,000 mg by mouth 2 times daily        Medications reviewed:  Medications reconciled to facility chart and changes were made to  reflect current medications as identified as above med list. Below are the changes that were made:   Medications stopped since last EPIC medication reconciliation:   There are no discontinued medications.    Medications started since last [facility] medication reconciliation:  No orders of the defined types were placed in this encounter.    REVIEW OF SYSTEMS:  4 point ROS including Respiratory, CV, GI and , other than that noted in the HPI,  is negative    Physical Exam:  /69  Pulse 70  Temp 98  F (36.7  C)  Resp 16  Wt 158 lb (71.7 kg)  SpO2 98%  BMI 27.12 kg/m2  GENERAL APPEARANCE:  Alert, in no distress, very pleasant and conversant  ENT: , moist mucous membranes, hearing acuity normal  EYES:  EOM, conjunctivae, lids normal  No c/o blurred vision at present   RESP:  respiratory effort of chest normal, no respiratory distress, Lung sounds clear  CV:  auscultation of heart done , rate and rhythm irreg, no  murmur, no rub or gallop, Edema trace  ABDOMEN:  normal bowel sounds, soft, nontender,  M/S:   Gait and station unsteady, good ROM    SKIN:  Pale w/d   PSYCH:  insight and judgement, memory impaired , affect and mood normal  Recent Labs:  All labs reviewed, none recent    Assessment/Plan:     Atrial fibrillation, unspecified type (H)  Dizziness  Benign essential hypertension      Orders:  1. Increase lisinopril to 20 mg po qd  2. BMP/HGB with next INR  3  Monitor BP/HR qd x 7 days    Total time spent with patient visit was 25 min including patient visit and review of past records. Greater than 50% of total time spent with counseling and coordinating care    Electronically signed by  EMILY Beltran CNP

## 2017-06-02 NOTE — TELEPHONE ENCOUNTER
TELEPHONE ENCOUNTER:      Francisco Masno is a 89 year old  (6/18/1920),Nurse called today to report: patient having tooth extracted today and was supposed to have INR drawn yesterday, unfortunately it was missed.  INR today is 2.28 and he has been stable on Coumadin 3.5 mg daily.       ASSESSMENT/PLAN  Dental extraction while on Coumadin    If dentist does tooth extraction HOLD Coumadin tonight    Give 1 mg both Saturday and Sunday    Recheck INR on Monday 6/5/17    EMILY Morrison CNP

## 2017-06-12 NOTE — PROGRESS NOTES
This patient's medication list and chart were reviewed as part of the service provided by [facility] and [facility]iatric Services.    Assessment/Recommendations:  No recommendations for changes at this time.    Katey Sales, Pharm.D.,[facility]  Board Certified Geriatric Pharmacist  Medication Therapy Management Pharmacist  [phone]

## 2017-07-10 NOTE — PROGRESS NOTES
"  [place] [facility]    Chief Complaint   Patient presents with     long-term Regulatory       HPI:    Francisco Mason is a 89 year old  (6/18/1920), who is being seen today for a federally mandated E/M visit at [facility] .  HPI information obtained from: facility chart records, facility staff and patient report. Today's concerns are:  Atrial fibrillation, unspecified type (H)  On chronic coumadin  Rate controlled today but varies     Angina pectoris (H)  Essential hypertension  Episode of CP without SOB or diaphoresis.  Patient felt weak, dizzy and that \"something wasn't right\".  Goal is to emphasize comfort with conservative treatment in NH  Usually accompanies hypertensive episode and patient had BP reading > 200 over>100.  On call gave extra lisinopril with BP then going down to 160s/80s and resolution of angina.  Discussed nitro patch with patient but she states \"it doesn't happen that often and they take care of it here\"   She is interested in preventing future hypertensive episodes  Weakness   Requiring extensive assistance from nursing  Up for meals only o/w spends the day resting in bed  Blind  Limits independence.  Unable to manage safely independently.  Long term (current) use of anticoagulants    ALLERGIES: Iodine; Levaquin [levofloxacin]; Penicillins; Plasticized base [bht-mo-polyethylene]; and Sulfa drugs  PAST MEDICAL HISTORY:  has a past medical history of A-fib (H); Cataract; Falls; Memory loss (6/2/2014); Puncture wound of ear drum, right (1/19/2015); Unspecified essential hypertension; Unsteady gait (6/2/2014); Vision problems; and Weakness.  PAST SURGICAL HISTORY:  has a past surgical history that includes Eye surgery and Phacoemulsification with standard intraocular lens implant (Right, 10/6/2014).  FAMILY HISTORY: family history is not on file.  SOCIAL HISTORY:  reports that she has never smoked. She has never used smokeless tobacco. She reports that she does not " drink alcohol or use illicit drugs.    MEDICATIONS:  Current Outpatient Prescriptions   Medication Sig Dispense Refill     AmLODIPine Besylate (NORVASC PO) Take 5 mg by mouth daily       nystatin (MYCOSTATIN) 460824 UNIT/GM POWD Apply topically 2 times daily as needed 60 g 5     Acetaminophen (TYLENOL PO) Take 1,000 mg by mouth every 6 hours as needed for mild pain or fever       brimonidine (ALPHAGAN) 0.2 % ophthalmic solution Place 1 drop into the right eye 2 times daily 1 Bottle 11     warfarin (COUMADIN) 1 MG tablet Take 1 mg by mouth As directed per INR       phenol (CHLORASEPTIC) 1.4 % LIQD Take 2 sprays by mouth every 3 hours as needed for sore throat       polyethylene glycol (MIRALAX/GLYCOLAX) packet Take 1 packet by mouth daily       LISINOPRIL PO Take 10 mg by mouth daily        VITAMIN D, CHOLECALCIFEROL, PO Take 1,000 Units by mouth daily       senna-docusate (SENOKOT-S;PERICOLACE) 8.6-50 MG per tablet Take 3 tablets by mouth At Bedtime        diltiazem (DILT-XR) 120 MG 24 hr ER capsule Take 120 mg by mouth daily       Acetaminophen (TYLENOL PO) Take 1,000 mg by mouth 2 times daily        Medications reviewed:  Medications reconciled to facility chart and changes were made to reflect current medications as identified as above med list. Below are the changes that were made:   Medications stopped since last EPIC medication reconciliation:   There are no discontinued medications.    Medications started since last [facility] medication reconciliation:  Orders Placed This Encounter   Medications     AmLODIPine Besylate (NORVASC PO)     Sig: Take 5 mg by mouth daily         Case Management:  I have reviewed the care plan and MDS and do agree with the plan. Patient's desire to return to the community is present, but is not able due to care needs .  Information reviewed:  Medications, vital signs, orders, and nursing notes.    ROS:  4 point ROS including Respiratory, CV, GI and , other than that noted in the HPI,   is negative    Exam:  Vitals: /66  Pulse 71  Temp 97.8  F (36.6  C)  Resp 16  Wt 156 lb (70.8 kg)  SpO2 96%  BMI 26.78 kg/m2  BMI= Body mass index is 26.78 kg/(m^2).  GENERAL APPEARANCE:  Alert, in no distress, very pleasant and conversant  ENT: , moist mucous membranes, hearing acuity normal  EYES:  EOM, conjunctivae, lids normal  No c/o blurred vision at present   RESP:  respiratory effort of chest normal, no respiratory distress, Lung sounds clear  CV:  auscultation of heart done , rate and rhythm irreg, no  murmur, no rub or gallop, Edema trace  ABDOMEN:  normal bowel sounds, soft, nontender,  M/S:   Gait and station unsteady, good ROM    SKIN:  Pale w/d   PSYCH:  insight and judgement, memory impaired , affect and mood normal    Lab/Diagnostic data:    Results for orders placed or performed in visit on 06/02/17   Hemoglobin   Result Value Ref Range    Hemoglobin 13.8 12.0 - 16.0 g/dL    [facility] 06/02/17   Basic metabolic panel   Result Value Ref Range    Sodium 141 136 - 145 mmol/L    Potassium 3.9 3.5 - 5.0 mmol/L    Chloride 104 98 - 107 mmol/L    Carbon Dioxide 26 22 - 31 mmol/L    Anion Gap 11 5 - 18 mmol/L    Glucose 77 70 - 125 mg/dL    Urea Nitrogen 12 8 - 28 mg/dL    Creatinine 0.86 0.60 - 1.10 mg/dL    Calcium 9.0 8.5 - 10.5 mg/dL    GFR Estimate >60 >60 mL/min/1.73m2    GFR Estimate If Black >60 ?60 mL/min/1.73m2    [facility] 06/02/17         ASSESSMENT/PLAN     Atrial fibrillation, unspecified type (H)  Angina pectoris (H)  Essential hypertension  Weakness  Blind  Long term (current) use of anticoagulants      Orders:  1. Norvasc 5 mg po qd  2. BP/HR QOD x 7 days    Total time spent with patient visit was 25 min including patient visit and review of past records. Greater than 50% of total time spent with counseling and coordinating care    Electronically signed by:  EMILY Beltran CNP

## 2017-07-19 NOTE — PROGRESS NOTES
[place] [facility]    Chief Complaint   Patient presents with     RECHECK       HPI:    Francisco Mason is a 89 year old  (6/18/1920), who is being seen today for an episodic care visit at [facility] .  HPI information obtained from: facility chart records, facility staff and patient report.Today's concern is:  Here to see patient at request of nursing    Atrial fibrillation, unspecified type (H)  On chronic coumadin  With goal to continue despite fall risk r/t fear of CVA and decreased function.  Denies CP/SOB, HA .      Angina pectoris (H)  Recurring problem usually coupled with hypertensive episodes  Goal is to emphasize comfort with conservative treatment in NH with prn nitro and O2 if necessary  Patient had hypertensive episode last night with CP but no diaphoresis, SOB.    Essential hypertension  Recurring problems with hypertensive episodes but usually BP/HR ok  ALLERGIES: Iodine; Levaquin [levofloxacin]; Penicillins; Plasticized base [bht-mo-polyethylene]; and Sulfa drugs  Past Medical, Surgical, Family and Social History reviewed and updated in [facility].    Current Outpatient Prescriptions   Medication Sig Dispense Refill     olopatadine (PATANOL) 0.1 % ophthalmic solution Place 1 drop Into the left eye 2 times daily as needed for allergies       AmLODIPine Besylate (NORVASC PO) Take 5 mg by mouth daily       nystatin (MYCOSTATIN) [id number] UNIT/GM POWD Apply topically 2 times daily as needed 60 g 5     Acetaminophen (TYLENOL PO) Take 1,000 mg by mouth every 6 hours as needed for mild pain or fever       brimonidine (ALPHAGAN) 0.2 % ophthalmic solution Place 1 drop into the right eye 2 times daily 1 Bottle 11     warfarin (COUMADIN) 1 MG tablet Take 1 mg by mouth As directed per INR       phenol (CHLORASEPTIC) 1.4 % LIQD Take 2 sprays by mouth every 3 hours as needed for sore throat       polyethylene glycol (MIRALAX/GLYCOLAX) packet Take 1 packet by mouth daily       LISINOPRIL PO Take 20  mg by mouth daily        VITAMIN D, CHOLECALCIFEROL, PO Take 1,000 Units by mouth daily       senna-docusate (SENOKOT-S;PERICOLACE) 8.6-50 MG per tablet Take 3 tablets by mouth At Bedtime        diltiazem (DILT-XR) 120 MG 24 hr ER capsule Take 120 mg by mouth daily       Acetaminophen (TYLENOL PO) Take 1,000 mg by mouth 2 times daily        Medications reviewed:  Medications reconciled to facility chart and changes were made to reflect current medications as identified as above med list. Below are the changes that were made:   Medications stopped since last EPIC medication reconciliation:   There are no discontinued medications.    Medications started since last [facility] medication reconciliation:  Orders Placed This Encounter   Medications     olopatadine (PATANOL) 0.1 % ophthalmic solution     Sig: Place 1 drop Into the left eye 2 times daily as needed for allergies       4 point ROS including Respiratory, CV, GI and , other than that noted in the HPI,  is negativeREVIEW OF SYSTEMS:  4 point ROS including Respiratory, CV, GI and , other than that noted in the HPI,  is negative    Physical Exam:  /74  Pulse 53  Temp 97.5  F (36.4  C)  Resp 16  Wt 156 lb (70.8 kg)  SpO2 93%  BMI 26.78 kg/m2  GENERAL APPEARANCE:  Alert, in no distress, very pleasant and conversant  ENT: , moist mucous membranes, hearing acuity normal  EYES:  EOM, conjunctivae, lids normal  No c/o blurred vision at present   RESP:  respiratory effort of chest normal, no respiratory distress, Lung sounds clear  CV:  auscultation of heart done , rate and rhythm irreg, no  murmur, no rub or gallop, Edema trace  ABDOMEN:  normal bowel sounds, soft, nontender,  M/S:   Gait and station unsteady, good ROM    SKIN:  Pale w/d   PSYCH:  insight and judgement, memory impaired , affect and mood normal       Results for orders placed or performed in visit on 06/02/17   Hemoglobin   Result Value Ref Range    Hemoglobin 13.8 12.0 - 16.0 g/dL    Narrative     [facility]   Basic metabolic panel   Result Value Ref Range    Sodium 141 136 - 145 mmol/L    Potassium 3.9 3.5 - 5.0 mmol/L    Chloride 104 98 - 107 mmol/L    Carbon Dioxide 26 22 - 31 mmol/L    Anion Gap 11 5 - 18 mmol/L    Glucose 77 70 - 125 mg/dL    Urea Nitrogen 12 8 - 28 mg/dL    Creatinine 0.86 0.60 - 1.10 mg/dL    Calcium 9.0 8.5 - 10.5 mg/dL    GFR Estimate >60 >60 mL/min/1.73m2    GFR Estimate If Black >60 ?60 mL/min/1.73m2    Narrative    [facility] 06/02/17       Assessment/Plan:     Atrial fibrillation, unspecified type (H)  Angina pectoris (H)  Essential hypertension      Orders:  Add norvasc 5 mg po qd  Check BP/HR qd x one week    Total time spent with patient visit at the [facility] nursing [facility] was 25 min including patient visit and review of past records. Greater than 50% of total time spent with counseling and coordinating care     Electronically signed by  EMILY Beltran CNP

## 2017-08-02 NOTE — PROGRESS NOTES
[facility]    Chief Complaint   Patient presents with     Nursing Home Acute       HPI:    Francisco Mason is a 89 year old  (6/18/1920), who is being seen today for an episodic care visit at [facility] .  HPI information obtained from: facility chart records, facility staff and [facility] chart review.Today's concern is:     Atrial fibrillation, unspecified type (H)  Angina pectoris (H)  Essential hypertension On chronic coumadin with close monitoring of INR.  INR therapeutic.  Denies CP/SOB, HA  Recurring episodes of angina usually coupled with hypertensive episodes.  norvasc added to lisinopril 20 mg qd and diltiazem  mg qd.  Goal is to emphasize comfort with conservative treatment in NH  Patient states she experienced no angina lately.  Tends to spend the day resting in bed and has limited mobility per her choice.    BP 7/8-7/31: 101-184/66-92 mmHg    ALLERGIES: Iodine; Levaquin [levofloxacin]; Penicillins; Plasticized base [bht-mo-polyethylene]; and Sulfa drugs  Past Medical, Surgical, Family and Social History reviewed and updated in [facility].    Current Outpatient Prescriptions   Medication Sig Dispense Refill     olopatadine (PATANOL) 0.1 % ophthalmic solution Place 1 drop Into the left eye 2 times daily as needed for allergies       AmLODIPine Besylate (NORVASC PO) Take 5 mg by mouth daily       nystatin (MYCOSTATIN) 770189 UNIT/GM POWD Apply topically 2 times daily as needed 60 g 5     Acetaminophen (TYLENOL PO) Take 1,000 mg by mouth every 6 hours as needed for mild pain or fever       brimonidine (ALPHAGAN) 0.2 % ophthalmic solution Place 1 drop into the right eye 2 times daily 1 Bottle 11     warfarin (COUMADIN) 1 MG tablet Take 1 mg by mouth As directed per INR       phenol (CHLORASEPTIC) 1.4 % LIQD Take 2 sprays by mouth every 3 hours as needed for sore throat       polyethylene glycol (MIRALAX/GLYCOLAX) packet Take 1 packet by mouth daily       LISINOPRIL PO Take  "20 mg by mouth daily        VITAMIN D, CHOLECALCIFEROL, PO Take 1,000 Units by mouth daily       senna-docusate (SENOKOT-S;PERICOLACE) 8.6-50 MG per tablet Take 3 tablets by mouth At Bedtime        diltiazem (DILT-XR) 120 MG 24 hr ER capsule Take 120 mg by mouth daily       Acetaminophen (TYLENOL PO) Take 1,000 mg by mouth 2 times daily        Medications reviewed:  Medications reconciled to facility chart and changes were made to reflect current medications as identified as above med list. Below are the changes that were made:   Medications stopped since last EPIC medication reconciliation:   There are no discontinued medications.    Medications started since last [facility] medication reconciliation:  No orders of the defined types were placed in this encounter.   REVIEW OF SYSTEMS:  4 point ROS including Respiratory, CV, GI and , other than that noted in the HPI,  is negative    Physical Exam:  /69  Pulse 66  Temp 97.3  F (36.3  C)  Resp 20  Ht 5' 4\" (1.626 m)  Wt 157 lb (71.2 kg)  SpO2 98%  BMI 26.95 kg/m2  GENERAL APPEARANCE:  Alert, in no distress, very pleasant and conversant  ENT: , moist mucous membranes, hearing acuity normal  EYES:  EOM, conjunctivae, lids normal  No c/o blurred vision at present   RESP:  respiratory effort of chest normal, no respiratory distress, Lung sounds clear  CV:  auscultation of heart done , rate and rhythm irreg, no  Murmur  Edema trace  ABDOMEN:  normal bowel sounds, soft, nontender,  M/S:   Gait and station unsteady, good ROM    SKIN:  Pale w/d   PSYCH:  insight and judgement, memory impaired , affect and mood normal    Recent Labs:      Recent Labs   Lab Test 06/02/17 09/09/16   HGB  13.8  13.1       Last Basic Metabolic Panel:  Recent Labs   Lab Test 06/02/17 03/01/16   NA  141  141   POTASSIUM  3.9  3.8   CHLORIDE  104  104   DARIELA  9.0  9.1   CO2  26  28   BUN  12  12   CR  0.86  0.83   GLC  77  71     Assessment/Plan:     Atrial fibrillation, unspecified type " (H)  Angina pectoris (H)  Essential hypertension      Orders:  Coumadin 3.5 mg coumadin qd  Check INR 8/21  The current medical regimen is effective;  continue present plan and medications.    Total time spent with patient visit at the skilled nursing facility was 25 min including patient visit and review of past records. Greater than 50% of total time spent with counseling and coordinating care     Electronically signed by  EMILY Beltran CNP

## 2017-09-19 PROBLEM — N62 HYPERTROPHY OF BREAST: Status: ACTIVE | Noted: 2017-01-01

## 2017-09-19 NOTE — PROGRESS NOTES
[facility]  Chief Complaint   Patient presents with     Nursing Home Acute   *THIS IS A COMBINED STUDENT NP and PRIMARY CARE NP NOTE.*    HPI:    Francisco Mason is a 89 year old  (6/18/1920), who is being seen today for an episodic care visit at [facility].    HPI information obtained from: facility chart records, facility staff and patient report.     Today's concern is:    Hypertrophy of breast, Right, with unclear etiology   Patient thinks this started recently, but wasn't sure. No complaints of pain. No c/o nausea/dizziness, CP/SOB, urinary or bowel difficulties. Patient states that she eats small meals. Hasn't worn a bra for years, but admits to sleeping on her right side. Alert, oriented to day, month, fuzzy on year.     REVIEW OF SYSTEMS:  4 point ROS including Respiratory, CV, GI and , other than that noted in the HPI,  is negative    /67  Pulse 65  Temp 97.8  F (36.6  C)  Resp 18  Wt 161 lb (73 kg)  SpO2 94%  BMI 27.64 kg/m2     Exam:  Constitutional: healthy, alert and no distress  Cardiovascular: negative, PMI normal. No lifts, heaves, or thrills. RRR. No murmurs, clicks gallops or rub. Slight 1+ pitting edema in BLE.  Respiratory: negative, Percussion normal. Good diaphragmatic excursion. Lungs clear  Breast:  Asymmetrical, with very slight erythema to right breast at the 4-5 o'clock area.  no palpable mass  no nipple discharge  no supraclavicular adenopathy  no infraclavicular adenopathy           Gastrointestinal: Abdomen soft, non-tender. BS normal. No masses, organomegaly  Neurologic: positive findings: confused  Psychiatric: affect normal/bright    ASSESSMENT/PLAN:    Hypertrophy of breast  Breast does not have a notable/palpatable mass. Noted asymmetry and what appears to be slight fluid, perhaps dependent as patient sleeps on that side. Discussed w/ patient regarding options for investigation. Recommending patient utilize wearing a supportive bra x 1 week as noted  below to relieve fluid build-up. Patient's plan is to have son bring in a bra from home. Provider will follow-up and If further diagnostics needed, we will consider an ultrasound if consistent with goals of care.     (Primary care NP)  Hypertrophy of breast, Right, with unclear etiology   I was present for exam and interview, I agree with the above with the addition of.     Etiology is unclear, appears to be acute on set.  Slightly pinkness to breast, roughly double the size of the Left one.  Non-tender, no palpable mass, no constitutional symptoms.  She does have some mild LE edema, but no evidence of clinical CHF.  Suspicion for a single or couple lymph nodes blocked, unclear if that would cause so much swelling/size increase.    -As noted, encouraged use of a bra for a week and will clinically monitor.   --If becomes problem or symptomatic, will proceed with US to further eval.     1. Encourage use of bra x 1 week and clinically monitor Dx right breast sewlling    Kiesha Otero RN BSN (NP Student).  Regan Loyola, APRN CNP     Add: We re-evaluated the Right breast again with Dr. Barrientos on 9/25.  Right breast remains swollen, bigger, but still non-tender, no warmth/redness, no enlarged lymph nodes, no masses.  Dr. Barrientos is less convinced it's localized swelling, due to the orange peel presentation on parts of the breast with palpation, he query's if it's malignancy, which she has no h/o and with acute onset, does not fit the presentation well either, but is a possibility.  We spoke with Mrs. Mason whom reminded us she is her own decision maker and at this time since it's not bothering her, she is ok with just clinically monitoring, does not want it tested at this time.  I also spoke with her son Terrance 9/27 and reviewed via phone and he is in agreement, if it's asymptomatic, no work up or intervention at this time.  If that should change, we will re-address the issue.

## 2017-09-24 NOTE — PROGRESS NOTES
[facility]    Chief Complaint   Patient presents with     senior care Regulatory       HPI:    Francisco Mason is a 89 year old  (6/18/1920), who is being seen today for a federally mandated E/M visit at [facility] .  HPI information obtained from: facility chart records, facility staff and patient report.       Today's concerns are:  - GNP reports that last week right breast double in size, soft non tender, no erythema, no sx. bed bound sleeps on the right side, no CHF exacerbation. Does not wear bra.   - Resident reports had a little pain last week in the right breast but it is gone now and has no concern.    - Appetite and sleep fine. Reports use cane for ambulation.     ----------------------------------------  - Past Medical, social, family histories, medications, and allergies reviewed and updated    - .Medications reviewed: in the EHR and Chart  - Case Management:  I have reviewed the care plan and MDS and do agree with the plan. Patient's desire to return to the community is not present.  Information reviewed:  Medications, vital signs, orders, and nursing notes.    ROS:  4 point ROS including Respiratory, CV, GI and , other than that noted in the HPI,  is negative    Exam:  Vitals: /67  Pulse 65  Temp 97.8  F (36.6  C)  Resp 18  Wt 161 lb (73 kg)  SpO2 94%  BMI 27.64 kg/m2  BMI= Body mass index is 27.64 kg/(m^2).  GENERAL APPEARANCE:  in no distress,   ENT:  Mouth and posterior oropharynx normal, moist mucous.[place] membranes, lost some teeth mainly on the top level  EYES:  EOM,  lids, pupils and irises normal. LOST VISUAL ACUITY IN LEFT EYE.    RESP:  respiratory effort and palpation of chest normal, lungs clear to auscultation , no respiratory distress  CV:  Palpation and auscultation of heart done , irregular rate  no murmur, rub, or gallop, no edema  ABDOMEN:  normal bowel sounds, soft, nontender, no hepatosplenomegaly or other masses  M/S:   Gait and station  abnormal, uses WC and a cane. Good hand . DIP and PIP nodules noted in both hands.  SKIN:  Inspection of skin and subcutaneous tissue baseline, Palpation of skin and subcutaneous tissue baseline  NEURO:   Cranial nerves 2-12 are normal tested and grossly at patient's baseline  PSYCH:  AAOx Person, and time  But not place. Judgement is intact, memory affected.    BREAST: right one is larger than the left one, has orange like skin, firmer than the left side especially the anterior part, no nipple retraction, or oozing. Slight erythema, no warmth. No palpable mass or nodule. LN in the axilla and supraclavicular non palpable.     Lab/Diagnostic data:    Results for orders placed or performed in visit on 06/02/17   Hemoglobin   Result Value Ref Range    Hemoglobin 13.8 12.0 - 16.0 g/dL    [facility] 06/02/17   Basic metabolic panel   Result Value Ref Range    Sodium 141 136 - 145 mmol/L    Potassium 3.9 3.5 - 5.0 mmol/L    Chloride 104 98 - 107 mmol/L    Carbon Dioxide 26 22 - 31 mmol/L    Anion Gap 11 5 - 18 mmol/L    Glucose 77 70 - 125 mg/dL    Urea Nitrogen 12 8 - 28 mg/dL    Creatinine 0.86 0.60 - 1.10 mg/dL    Calcium 9.0 8.5 - 10.5 mg/dL    GFR Estimate >60 >60 mL/min/1.73m2    GFR Estimate If Black >60 ?60 mL/min/1.73m2    [facility] 06/02/17         ASSESSMENT/PLAN  Atrial fibrillation, unspecified type (H)  - on coumadin with INR followed closely  - on diltiazem for rate control    Breast enlargement:  - resident does not want any thing to be done for it. Discussed different options including doing nothing. Resident is fine with having us contacting her son to discuss today visit, SENIA Spears will contact the son.      Benign essential hypertension  BP Readings from Last 3 Encounters:   09/24/17 108/67   09/19/17 108/67   08/02/17 112/69   -  On lisinopril 20 mg ( was 10 mg),  Also on diltiazem  mg, amlodipine 5 mg (added recently)  - Keep SBP> 130 mmHg and DBP > 65  mmHg (levels below these increase mortality as shown by standard studies and observations). Permit SBP up to 160 mmHg given limited age expectance.   - Check the BP reading in the facility EHR, and manage accordingly, if need to cut down on meds consider to stop amlodipine.     Frail elderly  - Significant  Deficits requiring NH placement. Requiring extensive assistance from nursing. Up for meals only o/w spends the day resting in bed        [place] (hard of hearing), bilateral  -  Limits communication.  Able to participate with conversation with quiet environment and 1:1 attention.    Senile nuclear sclerosis, unspecified laterality  - follows on Dr. Cormier's recommendations.      Alzheimer's disease of other onset  - Continue to anticipate needs. Chronic condition, ongoing decline expected.   -  Continue to provide redirection and reassurance as needed. Maintain safe living situation with goals focused on comfort      Orders:  - See above, otherwise, continue the rest of the current POC.       Electronically signed by:  Erma Barrientos MD

## 2017-10-03 PROBLEM — M79.89 SWELLING OF LIMB: Status: ACTIVE | Noted: 2017-01-01

## 2017-10-03 PROBLEM — Z79.01 CHRONIC ANTICOAGULATION: Status: ACTIVE | Noted: 2017-01-01

## 2017-10-03 NOTE — PROGRESS NOTES
[facility]    Francisco Mason is a 89 year old  (6/18/1920), who is being seen today for an episodic care visit at [facility] .  HPI information obtained from: facility chart records, facility staff, patient report and [facility] chart review.    We have been following Mrs. Mason due to a new onset of Right breast swelling, asymptomatic, unclear etiology.  RN asked me to assess her LE's due to L>R swelling.  Upon meeting Mrs. Mason she continues to endorse her Right breast does not bother her, no pain, no light headedness/dizziness, no fever/chills.  Continues to endorse comfort approach, no work up desired.  In looking at her LE's today the L is > R on swelling.  She denies any pain, no calf pain.  She does spend most of her time/day in bed, thus possible dependent edema.  We do not have an ECHO on profile, she is not on diuretics, no crackles.  I did offer compression stockings but she refuses, has refused in the past.  She does have A-fib on Warfarin, INR has been therapeutic.  Thus suspicion for DVT/PE is low, she again endorsed comfort approach.     Of note: Random lab glucose showed fasting glucose of 57 on 9/27.  Did few days of AM glucose checks all WNL's.  Thus was random event, no further hypoglycemia events.     Today's concern is:  Swelling of limb, bilateral LE's L>R, unclear etiology  As noted, etiology unclear, possible dependent edema due to spends most of day in bed, minimal activity.  Suspicion for DVT/PE low at this time.  Could be new mild CHF, but no other clinical signs to indicate this.  Asymptomatic.     Hypertrophy of breast, Right, with unclear etiology   As noted prior, etiology unclear.  Does have a orange peel presentation when compressed, there is mild firmness on the bottom/medial side, but mild, asymptomatic.     Chronic atrial fibrillation (H)  Chronic anticoagulation  INR has been therapeutic, next check 10/6.     ALLERGIES: Iodine; Levaquin  [levofloxacin]; Penicillins; Plasticized base [bht-mo-polyethylene]; and Sulfa drugs  Past Medical, Surgical, Family and Social History reviewed and updated in [facility].    Current Outpatient Prescriptions   Medication Sig Dispense Refill     olopatadine (PATANOL) 0.1 % ophthalmic solution Place 1 drop Into the left eye 2 times daily as needed for allergies       AmLODIPine Besylate (NORVASC PO) Take 5 mg by mouth daily       nystatin (MYCOSTATIN) [id number] UNIT/GM POWD Apply topically 2 times daily as needed 60 g 5     Acetaminophen (TYLENOL PO) Take 1,000 mg by mouth every 6 hours as needed for mild pain or fever       brimonidine (ALPHAGAN) 0.2 % ophthalmic solution Place 1 drop into the right eye 2 times daily 1 Bottle 11     warfarin (COUMADIN) 1 MG tablet Take 3.5 mg by mouth As directed per INR        phenol (CHLORASEPTIC) 1.4 % LIQD Take 2 sprays by mouth every 3 hours as needed for sore throat       polyethylene glycol (MIRALAX/GLYCOLAX) packet Take 1 packet by mouth daily       LISINOPRIL PO Take 20 mg by mouth daily        VITAMIN D, CHOLECALCIFEROL, PO Take 1,000 Units by mouth daily       senna-docusate (SENOKOT-S;PERICOLACE) 8.6-50 MG per tablet Take 3 tablets by mouth At Bedtime        diltiazem (DILT-XR) 120 MG 24 hr ER capsule Take 120 mg by mouth daily       Acetaminophen (TYLENOL PO) Take 1,000 mg by mouth 2 times daily        Medications reviewed:  Medications reconciled to facility chart and changes were made to reflect current medications as identified as above med list. Below are the changes that were made:   Medications stopped since last EPIC medication reconciliation:   There are no discontinued medications.    Medications started since last [facility] medication reconciliation:  No orders of the defined types were placed in this encounter.    REVIEW OF SYSTEMS:  7 point ROS done including, light headedness/dizziness, fever/chills, pain, Resp, CV, GI, and  and is negative other than noted in  HPI.    Physical Exam:  /65  Pulse 79  Temp 97.7  F (36.5  C)  Resp 18  Wt 160 lb (72.6 kg)  SpO2 95%  BMI 27.46 kg/m2     GENERAL APPEARANCE:  Elderly female resting in bed, NAD, non-toxic.  ENT:  Mouth and oropharynx normal, moist mucous membranes.   RESP:  Lungs CTA.  Regular relaxed breathing effort.  No cough.   CV: S1/S2 no murmur or rubs.  Irregular-irregular rhythm and rate.    ABDOMEN:  Flat, soft, non-tender with active bowel sounds.    EXTREMITIES:  Newer L>R ankle/LE edema, Left 3+, Right 1-2+, no calf tenderness.   PSYCH: Alert and orientated, pleasant and cooperative.     Recent Labs:  I have personally reviewed labs.    Assessment/Plan:  -Overall she reports she wants a comfort based plan.     Swelling of limb, bilateral LE's L>R, unclear etiology  -Clinically monitoring for now.   -Offered compression/elevation, refused.     Hypertrophy of breast, Right, with unclear etiology   -Clinically monitoring for now.     Chronic atrial fibrillation (H)  Chronic anticoagulation  -Continue to follow INR's next due 10/6.     Orders:  -No new orders.     Electronically signed by  EMILY Paula CNP

## 2017-10-17 PROBLEM — H25.10: Status: ACTIVE | Noted: 2017-01-01

## 2017-10-17 NOTE — PROGRESS NOTES
[place] [facility]  Chief Complaint   Patient presents with     Annual Comprehensive Nursing Home       HPI:    Francisco Mason is a 89 year old  (6/18/1920), who is being seen today for an annual comprehensive visit at [facility] .  HPI information obtained from: facility chart records, facility staff and patient report and son Terrance.      Met with Mrs. Mason today whom reports she is doing well.  She continues to report she feels bored but enjoys her son's visits, has no complaints.  She specifically denies any pain and continues to deny that her Right breast swelling is causing her any issues.      Today's concerns are:  Chronic atrial fibrillation (H)  Essential hypertension  Chronic anticoagulation  Alzheimer's dementia without behavioral disturbance, unspecified timing of dementia onset  Frail elderly  Senile nuclear sclerosis, unspecified laterality  Hypertrophy of breast, Right, with unclear etiology   Swelling of limb, bilateral LE's L>R, unclear etiology    ALLERGIES: Iodine; Levaquin [levofloxacin]; Penicillins; Plasticized base [bht-mo-polyethylene]; and Sulfa drugs  PROBLEM LIST:  Patient Active Problem List   Diagnosis     HTN, goal below 140/90     Osteoarthritis of knee     Atrial fibrillation (H)     Weakness     Memory loss     Unsteady gait      ANNUAL 11/3/16     Senile nuclear sclerosis     Puncture wound of ear drum, right     Long term (current) use of anticoagulants     Encounter for therapeutic drug monitoring     Dementia     Frail elderly     Hypertrophy of breast, Right, with unclear etiology      Swelling of limb, bilateral LE's L>R, unclear etiology     Chronic anticoagulation     Essential hypertension     Alzheimer's dementia without behavioral disturbance, unspecified timing of dementia onset     Senile nuclear sclerosis, unspecified laterality     PAST MEDICAL HISTORY:  has a past medical history of A-fib (H); Cataract; Falls; Memory loss (6/2/2014); Puncture  wound of ear drum, right (1/19/2015); Unspecified essential hypertension; Unsteady gait (6/2/2014); Vision problems; and Weakness.  PAST SURGICAL HISTORY:  has a past surgical history that includes Eye surgery and Phacoemulsification with standard intraocular lens implant (Right, 10/6/2014).  FAMILY HISTORY: family history is not on file.  SOCIAL HISTORY:  reports that she has never smoked. She has never used smokeless tobacco. She reports that she does not drink alcohol or use illicit drugs.  IMMUNIZATIONS:  Most Recent Immunizations   Administered Date(s) Administered     Influenza Vaccine, 3 YRS +, IM (QUADRIVALENT W/PRESERVATIVES) 09/29/2016     Pneumococcal (PCV 13) 10/20/2015     Pneumococcal 23 valent 01/01/2000     Tdap (Adacel,Boostrix) 07/26/2004     Above immunizations pulled from [employer]. [facility] and facility records also reconciled. Outstanding information sent to  to update [employer] Yes.  Future immunizations needed:  TDAP and yearly influenza per facility protocol.  Wellness checks  Labs: TSH and Hgb check due.   Cancer screening: (not indicated due to age/goals of care).  We suspect there may be Right breast cancer contributing to Right breast swelling, but multiple discussions with her and her son indicate they want a more palliative approach, no workup as its not bothering her.   Vaccinations: Influenza: TDAP and yearly influenza per facility protocol.    MEDICATIONS:  Current Outpatient Prescriptions   Medication Sig Dispense Refill     olopatadine (PATANOL) 0.1 % ophthalmic solution Place 1 drop Into the left eye 2 times daily as needed for allergies       AmLODIPine Besylate (NORVASC PO) Take 5 mg by mouth daily       nystatin (MYCOSTATIN) 402629 UNIT/GM POWD Apply topically 2 times daily as needed 60 g 5     Acetaminophen (TYLENOL PO) Take 1,000 mg by mouth every 6 hours as needed for mild pain or fever       brimonidine (ALPHAGAN) 0.2 % ophthalmic solution Place 1 drop  into the right eye 2 times daily 1 Bottle 11     warfarin (COUMADIN) 1 MG tablet Take 3.5 mg by mouth As directed per INR        phenol (CHLORASEPTIC) 1.4 % LIQD Take 2 sprays by mouth every 3 hours as needed for sore throat       polyethylene glycol (MIRALAX/GLYCOLAX) packet Take 1 packet by mouth daily       LISINOPRIL PO Take 20 mg by mouth daily        VITAMIN D, CHOLECALCIFEROL, PO Take 1,000 Units by mouth daily       senna-docusate (SENOKOT-S;PERICOLACE) 8.6-50 MG per tablet Take 3 tablets by mouth At Bedtime        diltiazem (DILT-XR) 120 MG 24 hr ER capsule Take 120 mg by mouth daily       Acetaminophen (TYLENOL PO) Take 1,000 mg by mouth 2 times daily        Medications reviewed:  Medications reconciled to facility chart and changes were made to reflect current medications as identified as above med list. Below are the changes that were made:   Medications stopped since last EPIC medication reconciliation:   There are no discontinued medications.    Medications started since last [facility] medication reconciliation:  No orders of the defined types were placed in this encounter.    Case Management:  I have reviewed the facility/SNF care plan/MDS which was done 9/6/17, including the falls risk, nutrition and pain screening. I also reviewed the current immunizations, and preventive care..Future cancer screening is not clinically indicated secondary to age/goals of care Patient's desire to return to the community is not present. Current Level of Care is appropriate.    Advance Directive Discussion:    I reviewed the current advanced directives as reflected in EPIC, the POLST and the facility chart, and verified the congruency of orders. I contacted the first party Terrance/Son and discussed the plan of Care.  I did review the advance directives with the resident as well.     Team Discussion:  I communicated with the appropriate disciplines involved with the Plan of Care:   Nursing:     Patient Goal: States she really  doesn't have any goals.   Patient's goal is pain control and comfort.    Information reviewed:  Medications, vital signs, orders, and nursing notes.    ROS:  10 point ROS of systems including Constitutional, Eyes, Respiratory, Cardiovascular, Gastroenterology, Genitourinary, Integumentary, Muscularskeletal, Psychiatric were all negative except for pertinent positives noted in my HPI.    Exam:  /71  Pulse 76  Temp 97.6  F (36.4  C)  Resp 18  Wt 157 lb (71.2 kg)  SpO2 97%  BMI 26.95 kg/m2    GENERAL APPEARANCE:  Elderly, thin, female resting in bed, NAD, not-toxic.  ENT:  Mouth and posterior oropharynx normal, moist mucous membranes, gross hearing acuity WNL.   EYES:  EOM, conjunctivae, lids, pupils WNL.   RESP:  Lungs are CTA.  Regular relaxed breathing effort, able to speak full sentences.  No cough.  CV:  S1/S2 no murmur or rubs.  Irregular-irregular rhythm and rate.    ABDOMEN:  Flat, soft, non-tender with active bowel sounds.  No guarding, rigidity, or rebound tenderness.  EXTREMITIES: 2-3+ LLE swelling, 1-2+ RLE, unchanged.  No calf tenderness.   SKIN:  Inspection/Palpation of skin and subcutaneous tissue WNL.   NEURO: CN II-XII grossly intact.   PSYCH:  Alert and orientated x3.  Cooperative and pleasant.  CAM negative.   BREASTS: Right continues to be larger than Left, firmer on the medial bottom side, slightly pink in the medial bottom side as well.  Continues to be no excessive warmth, tenderness, or palpable mass/nodule.      Depression screen done: PHQ-9 was done 9/6/17 and was 0-4 or negative.  Given screen score and clinical assessment patient is stable without any signs of depression and no futher interventions warrented at this time.    Based on JNC-8 goals,  patients age of 89 year old, no presence of diabetes or CKD, and goals of care goal BP is  <140/90 mm Hg. patient is stable with current plan of care and routine assessment..    Lab/Diagnostic data:  I have personally reviewed labs.      ASSESSMENT/PLAN  Chronic atrial fibrillation (H)  Essential hypertension  Chronic anticoagulation  Keep SBP> 130 mmHg and DBP > 65 mmHg (levels below these increase mortality as shown by standard studies and observations).  SBP has been stable and doing well on current regimen, still continues on Warfarin.   -Continues on Lisinopril and Norvasc.   -Continue to monitor INR's and adjust as needed.     Alzheimer's dementia without behavioral disturbance, unspecified timing of dementia onset  Stable.     Frail elderly  Continues to spend most of her time in bed, does get about room with help on occasion.      Senile nuclear sclerosis, unspecified laterality  -Continues on Alphagan gtts Right, BID.   -Continues PRN natural tears and Patanol PRN for allergies.     Hypertrophy of breast, Right, with unclear etiology   As noted before etiology is unclear, started roughly 1 month ago, has been asymptomatic.  It has not really changed much.  Thus suspicion for infectious etiology is low due to doing well, non-toxic, despite this being there.  Dr. Barrientos noted it does have a orange like skin with palpation, thus query malignancy, but no mass/nodes or lymph nodes are palpable.  As noted: We have had several discussions with her and her son Terrance and they both voiced if it's not bothering her, they do not want it worked up.    -Thus we continue to only clinically monitor at this time per patient wishes.     Swelling of limb, bilateral LE's L>R, unclear etiology  As noted, this has been ongoing.  No h/o CHF, no ECHO on profile, no other s/s of CHF thus unclear etiology.  With the breast hypertrophy, query if she could have malignancy and maybe some outflow lymph node obstruction causing LE swelling, but this is unclear.  INR has been therapeutic and asymptomatic, thus low suspicion for DVT, but it has not been ruled out.  Due to being asymptomatic, we have not worked this up either.   -Continue clinical monitoring only at this  time due to patient wishes.     Orders:  1. Labs on 10/23 to include Hgb, TSH and INR Dx annual and afib  2. Please give Tdap immunization Dx preventative     Electronically signed by:  EMILY Paula CNP

## 2017-11-14 NOTE — LETTER
11/14/2017        RE: Francisco Mason  [address]  [place] [address]          [place] [facility]    Chief Complaint   Patient presents with     correction Regulatory       HPI:    Francisco Mason is a 89 year old  (6/18/1920), who is being seen today for a federally mandated E/M visit at [facility] .  HPI information obtained from: facility chart records, facility staff, patient report and [facility] chart review.     Met with Mrs. Mason today for her regulatory visit.  Mrs. Mason reports she feels bored, wishes she had more visitors but otherwise has no complains, reports she has been doing well.  We did ask about her Right breast swelling that is somewhat newer, she continues to report it does not bother her.     ALLERGIES: Iodine; Levaquin [levofloxacin]; Penicillins; Plasticized base [bht-mo-polyethylene]; and Sulfa drugs  PAST MEDICAL HISTORY:  has a past medical history of A-fib (H); Cataract; Falls; Memory loss (6/2/2014); Puncture wound of ear drum, right (1/19/2015); Unspecified essential hypertension; Unsteady gait (6/2/2014); Vision problems; and Weakness.  PAST SURGICAL HISTORY:  has a past surgical history that includes Eye surgery and Phacoemulsification with standard intraocular lens implant (Right, 10/6/2014).  FAMILY HISTORY: family history is not on file.  SOCIAL HISTORY:  reports that she has never smoked. She has never used smokeless tobacco. She reports that she does not drink alcohol or use illicit drugs.    MEDICATIONS:  Current Outpatient Prescriptions   Medication Sig Dispense Refill     olopatadine (PATANOL) 0.1 % ophthalmic solution Place 1 drop Into the left eye 2 times daily as needed for allergies       AmLODIPine Besylate (NORVASC PO) Take 5 mg by mouth daily       nystatin (MYCOSTATIN) [id number] UNIT/GM POWD Apply topically 2 times daily as needed 60 g 5     Acetaminophen (TYLENOL PO) Take 1,000 mg by mouth every 6 hours as needed for  mild pain or fever       brimonidine (ALPHAGAN) 0.2 % ophthalmic solution Place 1 drop into the right eye 2 times daily 1 Bottle 11     warfarin (COUMADIN) 1 MG tablet Take 3.5 mg by mouth As directed per INR        phenol (CHLORASEPTIC) 1.4 % LIQD Take 2 sprays by mouth every 3 hours as needed for sore throat       polyethylene glycol (MIRALAX/GLYCOLAX) packet Take 1 packet by mouth daily       LISINOPRIL PO Take 20 mg by mouth daily        VITAMIN D, CHOLECALCIFEROL, PO Take 1,000 Units by mouth daily       senna-docusate (SENOKOT-S;PERICOLACE) 8.6-50 MG per tablet Take 3 tablets by mouth At Bedtime        diltiazem (DILT-XR) 120 MG 24 hr ER capsule Take 120 mg by mouth daily       Acetaminophen (TYLENOL PO) Take 1,000 mg by mouth 2 times daily        Medications reviewed:  Medications reconciled to facility chart and changes were made to reflect current medications as identified as above med list. Below are the changes that were made:   Medications stopped since last EPIC medication reconciliation:   There are no discontinued medications.    Medications started since last [facility] medication reconciliation:  No orders of the defined types were placed in this encounter.    Case Management:  I have reviewed the care plan and MDS and do agree with the plan. Patient's desire to return to the community is not present.  Information reviewed:  Medications, vital signs, orders, and nursing notes.    ROS:  7 point ROS done including, light headedness/dizziness, fever/chills, pain, Resp, CV, GI, and  and is negative other than noted in HPI.      Exam:  Vitals: /73  Pulse 91  Temp 97.3  F (36.3  C)  Resp 18  Wt 159 lb (72.1 kg)  SpO2 96%  BMI 27.29 kg/m2  BMI= Body mass index is 27.29 kg/(m^2).     GENERAL APPEARANCE:  Elderly female resting in bed, NAD, non-toxic.  ENT:  Mouth and oropharynx normal, moist mucous membranes.   RESP:  Lungs CTA.  Regular relaxed breathing effort.  No cough.   CV: S1/S2 no murmur or  rubs.  Irregular-irregular rhythm and rate.    ABDOMEN:  Flat, soft, non-tender with active bowel sounds.    EXTREMITIES:  L>R ankle/LE edema, Left 2+, Right trace, no calf tenderness, ongoing.   PSYCH: Alert and orientated, pleasant and cooperative.     Lab/Diagnostic data:    Results for orders placed or performed in visit on 10/23/17   TSH   Result Value Ref Range    TSH 0.30 0.30 - 5.00 uIU/mL    Narrative    [facility]   Hemoglobin   Result Value Ref Range    Hemoglobin 13.4 12.0 - 16.0 g/dL    Narrative    [facility]   INR   Result Value Ref Range    INR 2.62 (H) 0.90 - 1.10    Narrative    [facility]       ASSESSMENT/PLAN  Overall Mrs. Mason appears cognitively/memory stable, no changes.  Reports she has been doing well, has no complaints, no significant clinical changes.  Thus will be no changes to current POC at this time.     Alzheimer's dementia without behavioral disturbance, unspecified timing of dementia onset  Cognitive/memory is slightly impaired, been stable/unchanged.      Atrial fibrillation, unspecified type (H)  Essential hypertension  Chronic anticoagulation  Swelling of limb, bilateral LE's L>R, unclear etiology  As noted no Echo on profile, has L>R swelling that is ongoing.  Etiology unclear.  No respiratory issues and on anticoagulation thus low suspicion for DVT/PE and if there was, she is already on treatment with Warfarin.  BP/HR acceptable limits, good rate control.   -Doing well, no changes at this time.     Hypertrophy of breast, Right, with unclear etiology   This started roughly 2 months ago, etiology remains unclear, malignancy has not been ruled out.  Low suspicion for infectious etiology as no clinical changes, non-toxic.  She remains asymptomatic, she has declined workup on several occasions.  Thus we continue to only clinically monitor at this time.     Frail elderly  Unsteady gait, needs asst of 1 and spends most of her time in bed, fall risk.  Unchanged in nature.       Orders:  1. No new orders    Electronically signed by:  EMILY Paula CNP        Sincerely,        EMILY Paula CNP

## 2017-11-14 NOTE — PROGRESS NOTES
[place] [facility]    Chief Complaint   Patient presents with     shelter Regulatory       HPI:    Francisco Mason is a 89 year old  (6/18/1920), who is being seen today for a federally mandated E/M visit at [facility] .  HPI information obtained from: facility chart records, facility staff, patient report and [facility] chart review.     Met with Mrs. Mason today for her regulatory visit.  Mrs. Mason reports she feels bored, wishes she had more visitors but otherwise has no complains, reports she has been doing well.  We did ask about her Right breast swelling that is somewhat newer, she continues to report it does not bother her.     ALLERGIES: Iodine; Levaquin [levofloxacin]; Penicillins; Plasticized base [bht-mo-polyethylene]; and Sulfa drugs  PAST MEDICAL HISTORY:  has a past medical history of A-fib (H); Cataract; Falls; Memory loss (6/2/2014); Puncture wound of ear drum, right (1/19/2015); Unspecified essential hypertension; Unsteady gait (6/2/2014); Vision problems; and Weakness.  PAST SURGICAL HISTORY:  has a past surgical history that includes Eye surgery and Phacoemulsification with standard intraocular lens implant (Right, 10/6/2014).  FAMILY HISTORY: family history is not on file.  SOCIAL HISTORY:  reports that she has never smoked. She has never used smokeless tobacco. She reports that she does not drink alcohol or use illicit drugs.    MEDICATIONS:  Current Outpatient Prescriptions   Medication Sig Dispense Refill     olopatadine (PATANOL) 0.1 % ophthalmic solution Place 1 drop Into the left eye 2 times daily as needed for allergies       AmLODIPine Besylate (NORVASC PO) Take 5 mg by mouth daily       nystatin (MYCOSTATIN) [id number] UNIT/GM POWD Apply topically 2 times daily as needed 60 g 5     Acetaminophen (TYLENOL PO) Take 1,000 mg by mouth every 6 hours as needed for mild pain or fever       brimonidine (ALPHAGAN) 0.2 % ophthalmic solution Place 1 drop into the right  eye 2 times daily 1 Bottle 11     warfarin (COUMADIN) 1 MG tablet Take 3.5 mg by mouth As directed per INR        phenol (CHLORASEPTIC) 1.4 % LIQD Take 2 sprays by mouth every 3 hours as needed for sore throat       polyethylene glycol (MIRALAX/GLYCOLAX) packet Take 1 packet by mouth daily       LISINOPRIL PO Take 20 mg by mouth daily        VITAMIN D, CHOLECALCIFEROL, PO Take 1,000 Units by mouth daily       senna-docusate (SENOKOT-S;PERICOLACE) 8.6-50 MG per tablet Take 3 tablets by mouth At Bedtime        diltiazem (DILT-XR) 120 MG 24 hr ER capsule Take 120 mg by mouth daily       Acetaminophen (TYLENOL PO) Take 1,000 mg by mouth 2 times daily        Medications reviewed:  Medications reconciled to facility chart and changes were made to reflect current medications as identified as above med list. Below are the changes that were made:   Medications stopped since last EPIC medication reconciliation:   There are no discontinued medications.    Medications started since last [facility] medication reconciliation:  No orders of the defined types were placed in this encounter.    Case Management:  I have reviewed the care plan and MDS and do agree with the plan. Patient's desire to return to the community is not present.  Information reviewed:  Medications, vital signs, orders, and nursing notes.    ROS:  7 point ROS done including, light headedness/dizziness, fever/chills, pain, Resp, CV, GI, and  and is negative other than noted in HPI.      Exam:  Vitals: /73  Pulse 91  Temp 97.3  F (36.3  C)  Resp 18  Wt 159 lb (72.1 kg)  SpO2 96%  BMI 27.29 kg/m2  BMI= Body mass index is 27.29 kg/(m^2).     GENERAL APPEARANCE:  Elderly female resting in bed, NAD, non-toxic.  ENT:  Mouth and oropharynx normal, moist mucous membranes.   RESP:  Lungs CTA.  Regular relaxed breathing effort.  No cough.   CV: S1/S2 no murmur or rubs.  Irregular-irregular rhythm and rate.    ABDOMEN:  Flat, soft, non-tender with active bowel  sounds.    EXTREMITIES:  L>R ankle/LE edema, Left 2+, Right trace, no calf tenderness, ongoing.   PSYCH: Alert and orientated, pleasant and cooperative.     Lab/Diagnostic data:    Results for orders placed or performed in visit on 10/23/17   TSH   Result Value Ref Range    TSH 0.30 0.30 - 5.00 uIU/mL    Narrative    [facility]   Hemoglobin   Result Value Ref Range    Hemoglobin 13.4 12.0 - 16.0 g/dL    Narrative    [facility]   INR   Result Value Ref Range    INR 2.62 (H) 0.90 - 1.10    Narrative    [facility]       ASSESSMENT/PLAN  Overall Mrs. Mason appears cognitively/memory stable, no changes.  Reports she has been doing well, has no complaints, no significant clinical changes.  Thus will be no changes to current POC at this time.     Alzheimer's dementia without behavioral disturbance, unspecified timing of dementia onset  Cognitive/memory is slightly impaired, been stable/unchanged.      Atrial fibrillation, unspecified type (H)  Essential hypertension  Chronic anticoagulation  Swelling of limb, bilateral LE's L>R, unclear etiology  As noted no Echo on profile, has L>R swelling that is ongoing.  Etiology unclear.  No respiratory issues and on anticoagulation thus low suspicion for DVT/PE and if there was, she is already on treatment with Warfarin.  BP/HR acceptable limits, good rate control.   -Doing well, no changes at this time.     Hypertrophy of breast, Right, with unclear etiology   This started roughly 2 months ago, etiology remains unclear, malignancy has not been ruled out.  Low suspicion for infectious etiology as no clinical changes, non-toxic.  She remains asymptomatic, she has declined workup on several occasions.  Thus we continue to only clinically monitor at this time.     Frail elderly  Unsteady gait, needs asst of 1 and spends most of her time in bed, fall risk.  Unchanged in nature.      Orders:  1. No new orders    Electronically signed by:  EMILY Paula CNP

## 2018-01-01 ENCOUNTER — APPOINTMENT (OUTPATIENT)
Dept: CT IMAGING | Facility: CLINIC | Age: 83
End: 2018-01-01
Attending: EMERGENCY MEDICINE
Payer: COMMERCIAL

## 2018-01-01 ENCOUNTER — RECORDS - HEALTHEAST (OUTPATIENT)
Dept: LAB | Facility: CLINIC | Age: 83
End: 2018-01-01

## 2018-01-01 ENCOUNTER — APPOINTMENT (OUTPATIENT)
Dept: GENERAL RADIOLOGY | Facility: CLINIC | Age: 83
End: 2018-01-01
Attending: EMERGENCY MEDICINE
Payer: COMMERCIAL

## 2018-01-01 ENCOUNTER — HOSPITAL ENCOUNTER (EMERGENCY)
Facility: CLINIC | Age: 83
Discharge: HOME | End: 2018-01-25
Attending: EMERGENCY MEDICINE | Admitting: EMERGENCY MEDICINE
Payer: COMMERCIAL

## 2018-01-01 ENCOUNTER — MEDICAL CORRESPONDENCE (OUTPATIENT)
Dept: HEALTH INFORMATION MANAGEMENT | Facility: CLINIC | Age: 83
End: 2018-01-01

## 2018-01-01 ENCOUNTER — NURSING HOME VISIT (OUTPATIENT)
Dept: GERIATRICS | Facility: CLINIC | Age: 83
End: 2018-01-01
Payer: COMMERCIAL

## 2018-01-01 ENCOUNTER — TRANSFERRED RECORDS (OUTPATIENT)
Dept: HEALTH INFORMATION MANAGEMENT | Facility: CLINIC | Age: 83
End: 2018-01-01

## 2018-01-01 VITALS
OXYGEN SATURATION: 95 % | BODY MASS INDEX: 25.4 KG/M2 | HEART RATE: 84 BPM | RESPIRATION RATE: 24 BRPM | DIASTOLIC BLOOD PRESSURE: 71 MMHG | TEMPERATURE: 98.1 F | SYSTOLIC BLOOD PRESSURE: 150 MMHG | WEIGHT: 148 LBS

## 2018-01-01 VITALS
WEIGHT: 147 LBS | OXYGEN SATURATION: 95 % | TEMPERATURE: 98.1 F | HEART RATE: 84 BPM | RESPIRATION RATE: 24 BRPM | SYSTOLIC BLOOD PRESSURE: 150 MMHG | DIASTOLIC BLOOD PRESSURE: 71 MMHG | BODY MASS INDEX: 25.23 KG/M2

## 2018-01-01 VITALS
TEMPERATURE: 97.8 F | SYSTOLIC BLOOD PRESSURE: 162 MMHG | DIASTOLIC BLOOD PRESSURE: 89 MMHG | HEART RATE: 98 BPM | OXYGEN SATURATION: 95 % | RESPIRATION RATE: 18 BRPM

## 2018-01-01 VITALS
RESPIRATION RATE: 18 BRPM | SYSTOLIC BLOOD PRESSURE: 137 MMHG | HEART RATE: 57 BPM | BODY MASS INDEX: 27.64 KG/M2 | DIASTOLIC BLOOD PRESSURE: 74 MMHG | TEMPERATURE: 98 F | OXYGEN SATURATION: 95 % | WEIGHT: 161 LBS

## 2018-01-01 VITALS
BODY MASS INDEX: 27.62 KG/M2 | DIASTOLIC BLOOD PRESSURE: 74 MMHG | OXYGEN SATURATION: 95 % | HEART RATE: 57 BPM | WEIGHT: 161.8 LBS | HEIGHT: 64 IN | RESPIRATION RATE: 18 BRPM | SYSTOLIC BLOOD PRESSURE: 134 MMHG | TEMPERATURE: 98.1 F

## 2018-01-01 VITALS
DIASTOLIC BLOOD PRESSURE: 80 MMHG | OXYGEN SATURATION: 95 % | TEMPERATURE: 97.3 F | RESPIRATION RATE: 18 BRPM | WEIGHT: 156 LBS | HEART RATE: 80 BPM | BODY MASS INDEX: 26.78 KG/M2 | SYSTOLIC BLOOD PRESSURE: 151 MMHG

## 2018-01-01 VITALS
BODY MASS INDEX: 27.64 KG/M2 | SYSTOLIC BLOOD PRESSURE: 134 MMHG | RESPIRATION RATE: 18 BRPM | WEIGHT: 161 LBS | HEART RATE: 57 BPM | OXYGEN SATURATION: 95 % | DIASTOLIC BLOOD PRESSURE: 74 MMHG | TEMPERATURE: 98 F

## 2018-01-01 DIAGNOSIS — R54 FRAIL ELDERLY: ICD-10-CM

## 2018-01-01 DIAGNOSIS — N62 HYPERTROPHY OF BREAST: ICD-10-CM

## 2018-01-01 DIAGNOSIS — R10.9 ABDOMINAL PAIN, RIGHT LATERAL: Primary | ICD-10-CM

## 2018-01-01 DIAGNOSIS — R63.8 POOR FLUID INTAKE: ICD-10-CM

## 2018-01-01 DIAGNOSIS — R79.1 SUPRATHERAPEUTIC INR: ICD-10-CM

## 2018-01-01 DIAGNOSIS — R06.2 WHEEZING: ICD-10-CM

## 2018-01-01 DIAGNOSIS — R10.9 ABDOMINAL PAIN, RIGHT LATERAL: ICD-10-CM

## 2018-01-01 DIAGNOSIS — I10 ESSENTIAL HYPERTENSION: ICD-10-CM

## 2018-01-01 DIAGNOSIS — F02.80 ALZHEIMER'S DEMENTIA WITHOUT BEHAVIORAL DISTURBANCE, UNSPECIFIED TIMING OF DEMENTIA ONSET: ICD-10-CM

## 2018-01-01 DIAGNOSIS — J18.9 PNEUMONIA OF RIGHT LOWER LOBE DUE TO INFECTIOUS ORGANISM: Primary | ICD-10-CM

## 2018-01-01 DIAGNOSIS — G30.9 ALZHEIMER'S DEMENTIA WITHOUT BEHAVIORAL DISTURBANCE, UNSPECIFIED TIMING OF DEMENTIA ONSET: ICD-10-CM

## 2018-01-01 DIAGNOSIS — R05.9 COUGH: ICD-10-CM

## 2018-01-01 DIAGNOSIS — J39.2 THROAT MASS: Primary | ICD-10-CM

## 2018-01-01 DIAGNOSIS — M79.10 MYALGIA: ICD-10-CM

## 2018-01-01 DIAGNOSIS — J06.9 VIRAL UPPER RESPIRATORY TRACT INFECTION: Primary | ICD-10-CM

## 2018-01-01 DIAGNOSIS — J06.9 VIRAL UPPER RESPIRATORY TRACT INFECTION: ICD-10-CM

## 2018-01-01 DIAGNOSIS — R11.0 NAUSEA: ICD-10-CM

## 2018-01-01 DIAGNOSIS — J98.8 AIRWAY OBSTRUCTION: ICD-10-CM

## 2018-01-01 DIAGNOSIS — I48.91 ATRIAL FIBRILLATION, UNSPECIFIED TYPE (H): ICD-10-CM

## 2018-01-01 DIAGNOSIS — I48.91 ATRIAL FIBRILLATION, UNSPECIFIED TYPE (H): Primary | ICD-10-CM

## 2018-01-01 LAB
ALP SERPL-CCNC: 52 U/L (ref 45–120)
ALT SERPL W P-5'-P-CCNC: 10 U/L (ref 0–45)
ANION GAP SERPL CALCULATED.3IONS-SCNC: 11 MMOL/L (ref 5–18)
ANION GAP SERPL CALCULATED.3IONS-SCNC: 9 MMOL/L (ref 3–14)
ANION GAP SERPL CALCULATED.3IONS-SCNC: 9 MMOL/L (ref 5–18)
AST SERPL W P-5'-P-CCNC: 20 U/L (ref 0–40)
BASOPHILS # BLD AUTO: 0 10E9/L (ref 0–0.2)
BASOPHILS # BLD AUTO: 0 THOU/UL (ref 0–0.2)
BASOPHILS # BLD AUTO: 0 THOU/UL (ref 0–0.2)
BASOPHILS NFR BLD AUTO: 0 % (ref 0–2)
BASOPHILS NFR BLD AUTO: 0 % (ref 0–2)
BASOPHILS NFR BLD AUTO: 0.1 %
BILIRUB DIRECT SERPL-MCNC: 0.2 MG/DL
BILIRUB SERPL-MCNC: 0.5 MG/DL (ref 0–1)
BUN SERPL-MCNC: 13 MG/DL (ref 8–28)
BUN SERPL-MCNC: 15 MG/DL (ref 8–28)
BUN SERPL-MCNC: 16 MG/DL (ref 7–30)
CALCIUM SERPL-MCNC: 8.3 MG/DL (ref 8.5–10.1)
CALCIUM SERPL-MCNC: 8.8 MG/DL (ref 8.5–10.5)
CALCIUM SERPL-MCNC: 9 MG/DL (ref 8.5–10.5)
CHLORIDE BLD-SCNC: 103 MMOL/L (ref 98–107)
CHLORIDE BLD-SCNC: 106 MMOL/L (ref 98–107)
CHLORIDE SERPL-SCNC: 107 MMOL/L (ref 94–109)
CO2 SERPL-SCNC: 26 MMOL/L (ref 20–32)
CO2 SERPL-SCNC: 27 MMOL/L (ref 22–31)
CO2 SERPL-SCNC: 29 MMOL/L (ref 22–31)
CREAT SERPL-MCNC: 0.66 MG/DL (ref 0.52–1.04)
CREAT SERPL-MCNC: 0.74 MG/DL (ref 0.6–1.1)
CREAT SERPL-MCNC: 0.8 MG/DL (ref 0.6–1.1)
D DIMER PPP FEU-MCNC: 3.2 UG/ML FEU (ref 0–0.5)
DIFFERENTIAL METHOD BLD: ABNORMAL
EOSINOPHIL # BLD AUTO: 0 10E9/L (ref 0–0.7)
EOSINOPHIL # BLD AUTO: 0 THOU/UL (ref 0–0.4)
EOSINOPHIL # BLD AUTO: 0.1 THOU/UL (ref 0–0.4)
EOSINOPHIL NFR BLD AUTO: 0 % (ref 0–6)
EOSINOPHIL NFR BLD AUTO: 0.1 %
EOSINOPHIL NFR BLD AUTO: 2 % (ref 0–6)
ERYTHROCYTE [DISTWIDTH] IN BLOOD BY AUTOMATED COUNT: 15 % (ref 11–14.5)
ERYTHROCYTE [DISTWIDTH] IN BLOOD BY AUTOMATED COUNT: 15.1 % (ref 10–15)
ERYTHROCYTE [DISTWIDTH] IN BLOOD BY AUTOMATED COUNT: 15.4 % (ref 11–14.5)
FLUAV AG SPEC QL IA: NORMAL
FLUAV AG SPEC QL IA: NORMAL
FLUAV+FLUBV AG SPEC QL: NEGATIVE
FLUAV+FLUBV AG SPEC QL: NEGATIVE
FLUBV AG SPEC QL IA: NORMAL
FLUBV AG SPEC QL IA: NORMAL
GFR SERPL CREATININE-BSD FRML MDRD: 82 ML/MIN/1.7M2
GFR SERPL CREATININE-BSD FRML MDRD: >60 ML/MIN/1.73M2
GFR SERPL CREATININE-BSD FRML MDRD: >60 ML/MIN/1.73M2
GLUCOSE BLD-MCNC: 70 MG/DL (ref 70–125)
GLUCOSE BLD-MCNC: 85 MG/DL (ref 70–125)
GLUCOSE SERPL-MCNC: 125 MG/DL (ref 70–99)
HCT VFR BLD AUTO: 43.9 % (ref 35–47)
HCT VFR BLD AUTO: 44 % (ref 35–47)
HCT VFR BLD AUTO: 45.3 % (ref 35–47)
HGB BLD-MCNC: 14.1 G/DL (ref 12–16)
HGB BLD-MCNC: 14.5 G/DL (ref 12–16)
HGB BLD-MCNC: 14.6 G/DL (ref 11.7–15.7)
IMM GRANULOCYTES # BLD: 0.1 10E9/L (ref 0–0.4)
IMM GRANULOCYTES NFR BLD: 0.4 %
INR PPP: 10.93 (ref 0.9–1.1)
INR PPP: 2.97 (ref 0.86–1.14)
INR PPP: 9.11 (ref 0.9–1.1)
LYMPHOCYTES # BLD AUTO: 0.7 10E9/L (ref 0.8–5.3)
LYMPHOCYTES # BLD AUTO: 1.2 THOU/UL (ref 0.8–4.4)
LYMPHOCYTES # BLD AUTO: 1.2 THOU/UL (ref 0.8–4.4)
LYMPHOCYTES NFR BLD AUTO: 12 % (ref 20–40)
LYMPHOCYTES NFR BLD AUTO: 17 % (ref 20–40)
LYMPHOCYTES NFR BLD AUTO: 6.5 %
MCH RBC QN AUTO: 29.2 PG (ref 26.5–33)
MCH RBC QN AUTO: 29.4 PG (ref 27–34)
MCH RBC QN AUTO: 29.4 PG (ref 27–34)
MCHC RBC AUTO-ENTMCNC: 32 G/DL (ref 32–36)
MCHC RBC AUTO-ENTMCNC: 32 G/DL (ref 32–36)
MCHC RBC AUTO-ENTMCNC: 33.3 G/DL (ref 31.5–36.5)
MCV RBC AUTO: 88 FL (ref 78–100)
MCV RBC AUTO: 92 FL (ref 80–100)
MCV RBC AUTO: 92 FL (ref 80–100)
MONOCYTES # BLD AUTO: 0.8 THOU/UL (ref 0–0.9)
MONOCYTES # BLD AUTO: 0.9 10E9/L (ref 0–1.3)
MONOCYTES # BLD AUTO: 0.9 THOU/UL (ref 0–0.9)
MONOCYTES NFR BLD AUTO: 11 % (ref 2–10)
MONOCYTES NFR BLD AUTO: 8.3 %
MONOCYTES NFR BLD AUTO: 9 % (ref 2–10)
NEUTROPHILS # BLD AUTO: 5.1 THOU/UL (ref 2–7.7)
NEUTROPHILS # BLD AUTO: 7.9 THOU/UL (ref 2–7.7)
NEUTROPHILS # BLD AUTO: 9.5 10E9/L (ref 1.6–8.3)
NEUTROPHILS NFR BLD AUTO: 71 % (ref 50–70)
NEUTROPHILS NFR BLD AUTO: 79 % (ref 50–70)
NEUTROPHILS NFR BLD AUTO: 84.6 %
PHOSPHATE SERPL-MCNC: 2.9 MG/DL (ref 2.5–4.5)
PLATELET # BLD AUTO: 178 THOU/UL (ref 140–440)
PLATELET # BLD AUTO: 196 10E9/L (ref 150–450)
PLATELET # BLD AUTO: 252 THOU/UL (ref 140–440)
PMV BLD AUTO: 10.8 FL (ref 8.5–12.5)
PMV BLD AUTO: 11.7 FL (ref 8.5–12.5)
POTASSIUM BLD-SCNC: 3.4 MMOL/L (ref 3.5–5)
POTASSIUM BLD-SCNC: 4 MMOL/L (ref 3.5–5)
POTASSIUM SERPL-SCNC: 3 MMOL/L (ref 3.4–5.3)
RBC # BLD AUTO: 4.79 MILL/UL (ref 3.8–5.4)
RBC # BLD AUTO: 4.94 MILL/UL (ref 3.8–5.4)
RBC # BLD AUTO: 5 10E12/L (ref 3.8–5.2)
SODIUM SERPL-SCNC: 142 MMOL/L (ref 133–144)
SODIUM SERPL-SCNC: 142 MMOL/L (ref 136–145)
SODIUM SERPL-SCNC: 143 MMOL/L (ref 136–145)
SPECIMEN SOURCE: NORMAL
TROPONIN I SERPL-MCNC: 0.23 UG/L (ref 0–0.04)
TROPONIN I SERPL-MCNC: 0.23 UG/L (ref 0–0.04)
WBC # BLD AUTO: 11.2 10E9/L (ref 4–11)
WBC: 10 THOU/UL (ref 4–11)
WBC: 7.3 THOU/UL (ref 4–11)

## 2018-01-01 PROCEDURE — 85025 COMPLETE CBC W/AUTO DIFF WBC: CPT | Performed by: EMERGENCY MEDICINE

## 2018-01-01 PROCEDURE — 25000128 H RX IP 250 OP 636: Performed by: EMERGENCY MEDICINE

## 2018-01-01 PROCEDURE — 85610 PROTHROMBIN TIME: CPT | Performed by: EMERGENCY MEDICINE

## 2018-01-01 PROCEDURE — 96365 THER/PROPH/DIAG IV INF INIT: CPT | Mod: 59 | Performed by: EMERGENCY MEDICINE

## 2018-01-01 PROCEDURE — 70450 CT HEAD/BRAIN W/O DYE: CPT

## 2018-01-01 PROCEDURE — 71046 X-RAY EXAM CHEST 2 VIEWS: CPT

## 2018-01-01 PROCEDURE — 99310 SBSQ NF CARE HIGH MDM 45: CPT | Performed by: FAMILY MEDICINE

## 2018-01-01 PROCEDURE — 99309 SBSQ NF CARE MODERATE MDM 30: CPT | Performed by: NURSE PRACTITIONER

## 2018-01-01 PROCEDURE — 99215 OFFICE O/P EST HI 40 MIN: CPT | Performed by: PHYSICIAN ASSISTANT

## 2018-01-01 PROCEDURE — 25000125 ZZHC RX 250: Performed by: EMERGENCY MEDICINE

## 2018-01-01 PROCEDURE — 99285 EMERGENCY DEPT VISIT HI MDM: CPT | Mod: Z6 | Performed by: EMERGENCY MEDICINE

## 2018-01-01 PROCEDURE — 96375 TX/PRO/DX INJ NEW DRUG ADDON: CPT | Mod: 59 | Performed by: EMERGENCY MEDICINE

## 2018-01-01 PROCEDURE — 87804 INFLUENZA ASSAY W/OPTIC: CPT | Performed by: EMERGENCY MEDICINE

## 2018-01-01 PROCEDURE — 85379 FIBRIN DEGRADATION QUANT: CPT | Performed by: EMERGENCY MEDICINE

## 2018-01-01 PROCEDURE — 70491 CT SOFT TISSUE NECK W/DYE: CPT

## 2018-01-01 PROCEDURE — 99285 EMERGENCY DEPT VISIT HI MDM: CPT | Mod: 25 | Performed by: EMERGENCY MEDICINE

## 2018-01-01 PROCEDURE — 84484 ASSAY OF TROPONIN QUANT: CPT | Performed by: EMERGENCY MEDICINE

## 2018-01-01 PROCEDURE — 99358 PROLONG SERVICE W/O CONTACT: CPT | Performed by: NURSE PRACTITIONER

## 2018-01-01 PROCEDURE — 99204 OFFICE O/P NEW MOD 45 MIN: CPT | Performed by: NURSE PRACTITIONER

## 2018-01-01 PROCEDURE — 93005 ELECTROCARDIOGRAM TRACING: CPT | Mod: 76 | Performed by: EMERGENCY MEDICINE

## 2018-01-01 PROCEDURE — 93005 ELECTROCARDIOGRAM TRACING: CPT | Performed by: EMERGENCY MEDICINE

## 2018-01-01 PROCEDURE — 99308 SBSQ NF CARE LOW MDM 20: CPT | Performed by: NURSE PRACTITIONER

## 2018-01-01 PROCEDURE — 80048 BASIC METABOLIC PNL TOTAL CA: CPT | Performed by: EMERGENCY MEDICINE

## 2018-01-01 PROCEDURE — 93010 ELECTROCARDIOGRAM REPORT: CPT | Mod: Z6 | Performed by: EMERGENCY MEDICINE

## 2018-01-01 PROCEDURE — 99310 SBSQ NF CARE HIGH MDM 45: CPT | Performed by: NURSE PRACTITIONER

## 2018-01-01 PROCEDURE — 71260 CT THORAX DX C+: CPT

## 2018-01-01 RX ORDER — LORAZEPAM 0.5 MG/1
.5-1 TABLET ORAL
Status: DISCONTINUED | OUTPATIENT
Start: 2018-01-01 | End: 2018-01-01 | Stop reason: HOSPADM

## 2018-01-01 RX ORDER — PROCHLORPERAZINE 25 MG
12.5 SUPPOSITORY, RECTAL RECTAL EVERY 12 HOURS PRN
Status: DISCONTINUED | OUTPATIENT
Start: 2018-01-01 | End: 2018-01-01 | Stop reason: HOSPADM

## 2018-01-01 RX ORDER — IPRATROPIUM BROMIDE AND ALBUTEROL SULFATE 2.5; .5 MG/3ML; MG/3ML
1 SOLUTION RESPIRATORY (INHALATION) 4 TIMES DAILY
COMMUNITY

## 2018-01-01 RX ORDER — ONDANSETRON 2 MG/ML
4 INJECTION INTRAMUSCULAR; INTRAVENOUS ONCE
Status: COMPLETED | OUTPATIENT
Start: 2018-01-01 | End: 2018-01-01

## 2018-01-01 RX ORDER — ALBUTEROL SULFATE 0.83 MG/ML
1 SOLUTION RESPIRATORY (INHALATION)
COMMUNITY

## 2018-01-01 RX ORDER — ONDANSETRON 4 MG/1
4 TABLET, ORALLY DISINTEGRATING ORAL EVERY 6 HOURS PRN
Status: DISCONTINUED | OUTPATIENT
Start: 2018-01-01 | End: 2018-01-01 | Stop reason: HOSPADM

## 2018-01-01 RX ORDER — HALOPERIDOL 5 MG/ML
.5-1 INJECTION INTRAMUSCULAR
Status: DISCONTINUED | OUTPATIENT
Start: 2018-01-01 | End: 2018-01-01 | Stop reason: HOSPADM

## 2018-01-01 RX ORDER — POLYETHYLENE GLYCOL 3350 17 G/17G
17 POWDER, FOR SOLUTION ORAL 2 TIMES DAILY PRN
COMMUNITY

## 2018-01-01 RX ORDER — IOPAMIDOL 755 MG/ML
68 INJECTION, SOLUTION INTRAVASCULAR ONCE
Status: COMPLETED | OUTPATIENT
Start: 2018-01-01 | End: 2018-01-01

## 2018-01-01 RX ORDER — MORPHINE SULFATE 20 MG/ML
5 SOLUTION ORAL
Qty: 30 ML | Refills: 0 | Status: SHIPPED | OUTPATIENT
Start: 2018-01-01

## 2018-01-01 RX ORDER — LORAZEPAM 2 MG/ML
.5-1 INJECTION INTRAMUSCULAR
Status: DISCONTINUED | OUTPATIENT
Start: 2018-01-01 | End: 2018-01-01 | Stop reason: HOSPADM

## 2018-01-01 RX ORDER — IOPAMIDOL 755 MG/ML
80 INJECTION, SOLUTION INTRAVASCULAR ONCE
Status: COMPLETED | OUTPATIENT
Start: 2018-01-01 | End: 2018-01-01

## 2018-01-01 RX ORDER — ONDANSETRON 2 MG/ML
4 INJECTION INTRAMUSCULAR; INTRAVENOUS EVERY 6 HOURS PRN
Status: DISCONTINUED | OUTPATIENT
Start: 2018-01-01 | End: 2018-01-01 | Stop reason: HOSPADM

## 2018-01-01 RX ORDER — HYDROMORPHONE HYDROCHLORIDE 1 MG/ML
0.5 INJECTION, SOLUTION INTRAMUSCULAR; INTRAVENOUS; SUBCUTANEOUS
Status: DISCONTINUED | OUTPATIENT
Start: 2018-01-01 | End: 2018-01-01 | Stop reason: HOSPADM

## 2018-01-01 RX ORDER — MORPHINE SULFATE 10 MG/5ML
5-10 SOLUTION ORAL
Status: DISCONTINUED | OUTPATIENT
Start: 2018-01-01 | End: 2018-01-01 | Stop reason: HOSPADM

## 2018-01-01 RX ORDER — MORPHINE SULFATE 20 MG/ML
5-10 SOLUTION ORAL
Status: DISCONTINUED | OUTPATIENT
Start: 2018-01-01 | End: 2018-01-01 | Stop reason: HOSPADM

## 2018-01-01 RX ORDER — CLINDAMYCIN PHOSPHATE 900 MG/50ML
900 INJECTION, SOLUTION INTRAVENOUS EVERY 8 HOURS
Status: DISCONTINUED | OUTPATIENT
Start: 2018-01-01 | End: 2018-01-01 | Stop reason: HOSPADM

## 2018-01-01 RX ORDER — MORPHINE SULFATE 2 MG/ML
1-2 INJECTION, SOLUTION INTRAMUSCULAR; INTRAVENOUS
Status: DISCONTINUED | OUTPATIENT
Start: 2018-01-01 | End: 2018-01-01 | Stop reason: HOSPADM

## 2018-01-01 RX ORDER — PROCHLORPERAZINE MALEATE 5 MG
5 TABLET ORAL EVERY 6 HOURS PRN
Status: DISCONTINUED | OUTPATIENT
Start: 2018-01-01 | End: 2018-01-01 | Stop reason: HOSPADM

## 2018-01-01 RX ORDER — ACETAMINOPHEN 325 MG/1
650 TABLET ORAL EVERY 6 HOURS PRN
Status: DISCONTINUED | OUTPATIENT
Start: 2018-01-01 | End: 2018-01-01 | Stop reason: HOSPADM

## 2018-01-01 RX ADMIN — IOPAMIDOL 68 ML: 755 INJECTION, SOLUTION INTRAVENOUS at 09:49

## 2018-01-01 RX ADMIN — SODIUM CHLORIDE 95 ML: 9 INJECTION, SOLUTION INTRAVENOUS at 09:50

## 2018-01-01 RX ADMIN — CLINDAMYCIN PHOSPHATE 900 MG: 18 INJECTION, SOLUTION INTRAVENOUS at 13:52

## 2018-01-01 RX ADMIN — SODIUM CHLORIDE 70 ML: 9 INJECTION, SOLUTION INTRAVENOUS at 11:03

## 2018-01-01 RX ADMIN — IOPAMIDOL 80 ML: 755 INJECTION, SOLUTION INTRAVENOUS at 11:03

## 2018-01-01 RX ADMIN — ONDANSETRON 4 MG: 2 INJECTION INTRAMUSCULAR; INTRAVENOUS at 08:40

## 2018-01-03 PROBLEM — R10.9 ABDOMINAL PAIN, RIGHT LATERAL: Status: ACTIVE | Noted: 2018-01-01

## 2018-01-03 PROBLEM — R11.0 NAUSEA: Status: ACTIVE | Noted: 2018-01-01

## 2018-01-03 NOTE — LETTER
"    1/3/2018        RE: Francisco Mason  [address]  [facility] [address]        [place] [facility]    Chief Complaint   Patient presents with     Nursing Home Acute       HPI:    Francisco Mason is a 89 year old  (6/18/1920), who is being seen today for an episodic care visit at [facility] .  HPI information obtained from: facility chart records, facility staff, patient report and [facility] chart review.  Also spoke with son on phone.      Was called to Mrs. Mason's room by RN due to Mrs. Mason had a vague c/o abdominal discomfort and nausea.  Mrs. Mason has a moderate degree of cognitive/memory impairment thus is a poor historian but she reports she has been having Right sided abdominal discomfort and nausea which has come/gone over the last month.  She reports it doesn't get really bad, but I also note in review she has been refusing some meds due to \"Upset\" stomach.  She reports she's having flatus and BM's, more on the constipated side.  She does not know if it get's better/worse with eating.  She denies any fever/chills, no light headedness/dizziness.  No dysuria.   She thinks she has had her gallbladder and appendix removed in the past.  No other complaints.     ALLERGIES: Iodine; Levaquin [levofloxacin]; Penicillins; Plasticized base [bht-mo-polyethylene]; and Sulfa drugs  Past Medical, Surgical, Family and Social History reviewed and updated in EPIC.    MEDICATIONS: Medications were reviewed personally using facility paper MAR.  No electronic med reconcilation available at this time.     REVIEW OF SYSTEMS:  7 point ROS done including, light headedness/dizziness, fever/chills, pain, Resp, CV, GI, and  and is negative other than noted in HPI.      Physical Exam:  /74  Pulse 57  Temp 98.1  F (36.7  C)  Resp 18  Ht 5' 4\" (1.626 m)  Wt 161 lb 12.8 oz (73.4 kg)  SpO2 95%  BMI 27.77 kg/m2     GENERAL APPEARANCE:  Elderly female resting in bed, NAD, " non-toxic.  ENT:  Mouth and oropharynx normal, moist mucous membranes.   RESP:  Lungs CTA.  Regular relaxed breathing effort.  No cough.   CV: S1/S2 no murmur or rubs.  Irregular-irregular rhythm and rate.    ABDOMEN: Protuberant with tenderness with deep palpation of the RLE and RUQ, Wang sign negative, RLE was more tender.  No tenderness on Left side.  Abd was soft, with active bowel sounds.  Possible deep mass vs stool felt on deep palpation.  No guarding, rigidity, or rebound tenderness.  EXTREMITIES:  L>R LE edema, Left 2+, Right 1+ no calf tenderness, ongoing/chronic.   PSYCH: Alert to self, somewhat to situation.  Moderate degree of cognitive/memory impairment.  Appears stable/unchanged.       Recent Labs:  None recent.     Assessment/Plan:  Abdominal pain, Right sided lower>upper, unclear etiology  Nausea, intermittent  Alzheimer's dementia without behavioral disturbance, unspecified timing of dementia onset  Hypertrophy of breast, Right, with unclear etiology   Cognitive/memory impairment appears stable/unchanged, but unfortunately it does make her a poor historian.  She reports the symptoms for roughly 1 month, but this is the first we are hearing of it, the RN also questions if it's been a month but unclear.  What is clear is that she does have Right sided pain RLQ>RUQ.  We do not have h/o gallbladder/appendix removed in EMR, but she and son think she has.  She reports the discomfort and nausea as mild and intermittent.  She endorses ongoing constipation.  I do feel some firmness on deep palpation on Right side, unclear if stool or other.  No surgical abdominal findings, non-toxic, thus lower suspicion for perforation/infection.  Query if she is constipated vs other bile duct obstruction of some sort.  Of note: She was noted to have developed Right breast swelling 4-6 months ago with unclear etiology, non-tender, asymptomatic.  Per her and family/POA, no workup being pursued.  Query malignancy, and if  there is a mass in her abdomen, query if related and causing symptoms.  Unclear at this point.  In speaking with her, she reports symptoms as mild, does not want to do anything about it.  In speaking with Son, he is also on the minimal side, but we did agree to get labs and treat for constipation to see if that reveals anything at this time, no imaging at this time.   -Will give boost of Miralax to clean out bowels.   -Will get LFT's, CBC, BMP for completeness.   -Clinically monitor.     Orders:    1) Increase Miralax 17 g to BID x 3 days, do not hold for loose stools. After 3 days return to prior QD schedule.  2) Get AST, ALT, ALK Phos, T Bili, D Bili, and CBC w/Diff, BMP labs on 1/3/18.    Electronically signed by  EMILY Paula CNP                      Sincerely,        EMILY Paula CNP

## 2018-01-03 NOTE — PROGRESS NOTES
"[place] [facility]    Chief Complaint   Patient presents with     Nursing Home Acute       HPI:    Francisco Mason is a 89 year old  (6/18/1920), who is being seen today for an episodic care visit at [facility] .  HPI information obtained from: facility chart records, facility staff, patient report and [facility] chart review.  Also spoke with son on phone.      Was called to Mrs. Mason's room by RN due to Mrs. Mason had a vague c/o abdominal discomfort and nausea.  Mrs. Mason has a moderate degree of cognitive/memory impairment thus is a poor historian but she reports she has been having Right sided abdominal discomfort and nausea which has come/gone over the last month.  She reports it doesn't get really bad, but I also note in review she has been refusing some meds due to \"Upset\" stomach.  She reports she's having flatus and BM's, more on the constipated side.  She does not know if it get's better/worse with eating.  She denies any fever/chills, no light headedness/dizziness.  No dysuria.   She thinks she has had her gallbladder and appendix removed in the past.  No other complaints.     ALLERGIES: Iodine; Levaquin [levofloxacin]; Penicillins; Plasticized base [bht-mo-polyethylene]; and Sulfa drugs  Past Medical, Surgical, Family and Social History reviewed and updated in EPIC.    MEDICATIONS: Medications were reviewed personally using facility paper MAR.  No electronic med reconcilation available at this time.     REVIEW OF SYSTEMS:  7 point ROS done including, light headedness/dizziness, fever/chills, pain, Resp, CV, GI, and  and is negative other than noted in HPI.      Physical Exam:  /74  Pulse 57  Temp 98.1  F (36.7  C)  Resp 18  Ht 5' 4\" (1.626 m)  Wt 161 lb 12.8 oz (73.4 kg)  SpO2 95%  BMI 27.77 kg/m2     GENERAL APPEARANCE:  Elderly female resting in bed, NAD, non-toxic.  ENT:  Mouth and oropharynx normal, moist mucous membranes.   RESP:  Lungs CTA.  Regular relaxed " breathing effort.  No cough.   CV: S1/S2 no murmur or rubs.  Irregular-irregular rhythm and rate.    ABDOMEN: Protuberant with tenderness with deep palpation of the RLE and RUQ, Wang sign negative, RLE was more tender.  No tenderness on Left side.  Abd was soft, with active bowel sounds.  Possible deep mass vs stool felt on deep palpation.  No guarding, rigidity, or rebound tenderness.  EXTREMITIES:  L>R LE edema, Left 2+, Right 1+ no calf tenderness, ongoing/chronic.   PSYCH: Alert to self, somewhat to situation.  Moderate degree of cognitive/memory impairment.  Appears stable/unchanged.       Recent Labs:  None recent.     Assessment/Plan:  Abdominal pain, Right sided lower>upper, unclear etiology  Nausea, intermittent  Alzheimer's dementia without behavioral disturbance, unspecified timing of dementia onset  Hypertrophy of breast, Right, with unclear etiology   Cognitive/memory impairment appears stable/unchanged, but unfortunately it does make her a poor historian.  She reports the symptoms for roughly 1 month, but this is the first we are hearing of it, the RN also questions if it's been a month but unclear.  What is clear is that she does have Right sided pain RLQ>RUQ.  We do not have h/o gallbladder/appendix removed in EMR, but she and son think she has.  She reports the discomfort and nausea as mild and intermittent.  She endorses ongoing constipation.  I do feel some firmness on deep palpation on Right side, unclear if stool or other.  No surgical abdominal findings, non-toxic, thus lower suspicion for perforation/infection.  Query if she is constipated vs other bile duct obstruction of some sort.  Of note: She was noted to have developed Right breast swelling 4-6 months ago with unclear etiology, non-tender, asymptomatic.  Per her and family/POA, no workup being pursued.  Query malignancy, and if there is a mass in her abdomen, query if related and causing symptoms.  Unclear at this point.  In speaking  with her, she reports symptoms as mild, does not want to do anything about it.  In speaking with Son, he is also on the minimal side, but we did agree to get labs and treat for constipation to see if that reveals anything at this time, no imaging at this time.   -Will give boost of Miralax to clean out bowels.   -Will get LFT's, CBC, BMP for completeness.   -Clinically monitor.     Orders:    1) Increase Miralax 17 g to BID x 3 days, do not hold for loose stools. After 3 days return to prior QD schedule.  2) Get AST, ALT, ALK Phos, T Bili, D Bili, and CBC w/Diff, BMP labs on 1/3/18.    Electronically signed by  EMILY Paula CNP

## 2018-01-04 NOTE — PROGRESS NOTES
[place] [facility]    Chief Complaint   Patient presents with     correction Regulatory       HPI:    Francisco Mason is a 89 year old  (6/18/1920), who is being seen today for a federally mandated E/M visit at [facility] .  HPI information obtained from: facility chart records, facility staff, patient report and [facility] chart review.     Today's concerns are:    - - Resident seen and examined.   - reports sore throat/stuffy nose, tired, cold, and some cough.   - reports mild pain in the upper right abdomen for the last a couple of weeks. Denies nausea or vomiting.   - Reports sleep, appetite and BM are fine.   - GNP reports possible constipation, miralax increased.   -----------------------------    - Past Medical, social, family histories, medications, and allergies reviewed and updated  - Medications reviewed: in the chart and EHR.   - Case Management:   I have reviewed the care plan and MDS and do agree with the plan. Patient's desire to return to the community is not present.  Information reviewed:  Medications, vital signs, orders, and nursing notes.    MEDICATIONS:  Current Outpatient Prescriptions   Medication Sig Dispense Refill     olopatadine (PATANOL) 0.1 % ophthalmic solution Place 1 drop Into the left eye 2 times daily as needed for allergies       AmLODIPine Besylate (NORVASC PO) Take 5 mg by mouth daily       nystatin (MYCOSTATIN) 073721 UNIT/GM POWD Apply topically 2 times daily as needed 60 g 5     Acetaminophen (TYLENOL PO) Take 1,000 mg by mouth every 6 hours as needed for mild pain or fever       brimonidine (ALPHAGAN) 0.2 % ophthalmic solution Place 1 drop into the right eye 2 times daily 1 Bottle 11     warfarin (COUMADIN) 1 MG tablet Take 3.5 mg by mouth As directed per INR        phenol (CHLORASEPTIC) 1.4 % LIQD Take 2 sprays by mouth every 3 hours as needed for sore throat       polyethylene glycol (MIRALAX/GLYCOLAX) packet Take 1 packet by mouth daily  Increased to BID for 3 days starting 1/3/18       LISINOPRIL PO Take 20 mg by mouth daily        VITAMIN D, CHOLECALCIFEROL, PO Take 1,000 Units by mouth daily       senna-docusate (SENOKOT-S;PERICOLACE) 8.6-50 MG per tablet Take 3 tablets by mouth At Bedtime        diltiazem (DILT-XR) 120 MG 24 hr ER capsule Take 120 mg by mouth daily       Acetaminophen (TYLENOL PO) Take 1,000 mg by mouth 2 times daily        ROS:  4 point ROS including Respiratory, CV, GI and , other than that noted in the HPI,  is negative    Exam:  Vitals: /74  Pulse 57  Temp 98  F (36.7  C)  Resp 18  Wt 161 lb (73 kg)  SpO2 95%  BMI 27.64 kg/m2  BMI= Body mass index is 27.64 kg/(m^2).  GENERAL APPEARANCE:  in no distress,   ENT:  Mouth and posterior oropharynx normal, moist mucous.[place] membranes, lost some teeth mainly on the top level  EYES:  EOM,  lids, pupils and irises normal. LOST VISUAL ACUITY IN LEFT EYE.    RESP:  respiratory effort and palpation of chest normal, lungs clear to auscultation , no respiratory distress  CV:  Palpation and auscultation of heart done , irregular rate  no murmur, rub, or gallop, no edema  ABDOMEN:  normal bowel sounds, soft, nontender, no hepatosplenomegaly or other masses  M/S:   Gait and station abnormal, uses WC and a cane. Good hand . DIP and PIP nodules noted in both hands.  SKIN:  Inspection of skin and subcutaneous tissue baseline, Palpation of skin and subcutaneous tissue baseline  NEURO:   Cranial nerves 2-12 are normal tested and grossly at patient's baseline  PSYCH:  AAOx Person, and time  But not place. Judgement is intact, memory affected.      Lab/Diagnostic data:  All labs reviewed, none recent.      ASSESSMENT/PLAN  Atrial fibrillation, unspecified type (H)  - .on coumadin with INR followed closely  - on diltiazem for rate control    Possible Influenza:  - will swap her, follow on the result, if positive treat.     Essential hypertension  BP Readings from Last 3 Encounters:    01/04/18 134/74   01/03/18 134/74   11/14/17 110/73   - controlled. on amlodipine 5 mg, lisinopril 20 mg, diltiazem  mg  - Keep SBP> 130 mmHg and DBP > 65 mmHg (levels below these increase mortality as shown by standard studies and observations).     Abdominal pain, Right sided lower>upper, unclear etiology  Nausea, intermittent  - Hepatobiliary system pathology is a possibility, vs constipation. .   - Miralax increased,   - LFTs ordered, follow on the results  - if persists, [facility] could be an option if Resident / family want to go through that route    Hypertrophy of breast, Right, with unclear etiology   - Pt and family wants conservative measures, agree given risk weights benefits any intervention given limited life expectancy.     Alzheimer's dementia without behavioral disturbance, unspecified timing of dementia onset  - Continue to anticipate needs. Chronic condition, ongoing decline expected.   -  Continue to provide redirection and reassurance as needed. Maintain safe living situation with goals focused on comfort.    Orders:  - See above, otherwise, continue the rest of the current POC.     Electronically signed by:  Erma Barrientos MD

## 2018-01-10 NOTE — LETTER
1/10/2018        RE: Francisco Mason  [address]  [facility] [address]        [place] [facility]    Chief Complaint   Patient presents with     Nursing Home Acute       HPI:    Francisco Mason is a 89 year old  (6/18/1920), who is being seen today for an episodic care visit at [facility] .  HPI information obtained from: facility chart records, facility staff, patient report and [facility] chart review.  Also spent 15+ minutes with son/DARIUSZ Carlos on the phone giving updates.     We were called to Mrs. Mason's room due to RN reporting she has not been eating/drinking much, not taking her pills.  Also following up to last weeks c/o abdominal pain.  Mrs. Mason reports she feels well.  She initially reported her abdominal pain as gone/resolved.  But with ongoing questioning she did admit it still hurts but not as much as last week.  She continues to deny N/V or other issues.  When we asked why she is not eating/drinking or taking her medications, she denies that and reports she has been taking them.  She does admit to not having an appetite, but has no other complaints.     ALLERGIES: Iodine; Levaquin [levofloxacin]; Penicillins; Plasticized base [bht-mo-polyethylene]; and Sulfa drugs  Past Medical, Surgical, Family and Social History reviewed and updated in [facility].    Current Outpatient Prescriptions   Medication Sig Dispense Refill     polyethylene glycol (MIRALAX/GLYCOLAX) Packet Take 17 g by mouth daily       olopatadine (PATANOL) 0.1 % ophthalmic solution Place 1 drop Into the left eye 2 times daily as needed for allergies       AmLODIPine Besylate (NORVASC PO) Take 5 mg by mouth daily       nystatin (MYCOSTATIN) 052212 UNIT/GM POWD Apply topically 2 times daily as needed 60 g 5     Acetaminophen (TYLENOL PO) Take 1,000 mg by mouth every 6 hours as needed for mild pain or fever       brimonidine (ALPHAGAN) 0.2 % ophthalmic solution Place 1 drop into the right eye 2 times  daily 1 Bottle 11     warfarin (COUMADIN) 1 MG tablet Take 3.5 mg by mouth As directed per INR        phenol (CHLORASEPTIC) 1.4 % LIQD Take 2 sprays by mouth every 3 hours as needed for sore throat       LISINOPRIL PO Take 20 mg by mouth daily        VITAMIN D, CHOLECALCIFEROL, PO Take 1,000 Units by mouth daily       senna-docusate (SENOKOT-S;PERICOLACE) 8.6-50 MG per tablet Take 3 tablets by mouth At Bedtime        diltiazem (DILT-XR) 120 MG 24 hr ER capsule Take 120 mg by mouth daily       Acetaminophen (TYLENOL PO) Take 1,000 mg by mouth 2 times daily        Medications reviewed:  Medications reconciled to facility chart and changes were made to reflect current medications as identified as above med list. Below are the changes that were made:   Medications stopped since last EPIC medication reconciliation:   Medications Discontinued During This Encounter   Medication Reason     polyethylene glycol (MIRALAX/GLYCOLAX) packet        Medications started since last [facility] medication reconciliation:  Orders Placed This Encounter   Medications     polyethylene glycol (MIRALAX/GLYCOLAX) Packet     Sig: Take 17 g by mouth daily       REVIEW OF SYSTEMS:  7 point ROS done including, light headedness/dizziness, fever/chills, pain, Resp, CV, GI, and  and is negative other than noted in HPI.    Physical Exam:  /74  Pulse 57  Temp 98  F (36.7  C)  Resp 18  Wt 161 lb (73 kg)  SpO2 95%  BMI 27.64 kg/m2     GENERAL APPEARANCE:  Elderly female resting in bed, NAD, non-toxic.  ENT:  Mouth and oropharynx normal, moist mucous membranes.   RESP:  Lungs CTA.  Regular relaxed breathing effort.  No cough.   CV: S1/S2 no murmur or rubs.  Irregular-irregular rhythm and rate.    ABDOMEN: Protuberant with mild tenderness with deep palpation of the RLE and RUQ, Wang sign negative, RLE was more tender.  No tenderness on Left side.  Abd was soft, with active bowel sounds.  No guarding, rigidity, or rebound  tenderness.  EXTREMITIES:  L>R LE edema, Left 2+, Right 1+ no calf tenderness, ongoing/chronic.   PSYCH: Alert to self, somewhat to situation.  Moderate degree of cognitive/memory impairment. Appears stable/unchanged.       Recent Labs:  I have personally reviewed labs.     Assessment/Plan:  Abdominal pain, Right sided lower>upper, unclear etiology  Last week her R sided abdominal pain was unclear.  Labs / abd labs were WNL's.  Today pain/tenderness is not as much as last week but is still present.  She has been having BM's since a few days of increased laxatives.  Etiology into ongoing abd pain is still unclear.  Unclear if it's related to her not eating/drinking taking pills or not.  In speaking with son/Terrance on the phone.  He does not want to pursue any further workup at this time.  Reports he will be in next week and stop by so we can see her together.    -Will only clinically monitor at this time.     Alzheimer's dementia without behavioral disturbance, unspecified timing of dementia onset  Frail elderly  She continues to deny depression.  She does endorse she wishes she could be with family, reports she is bored a lot, but denies depression.  She is not on a anti depressant.  Will not start one now but will consider next week after meeting son and assessing together.   -Clinically monitor at this time.     Orders:  1. Please offer pills 2-3 times a day, if she refuses and encourage taking.  2. Encourage PO intake, goal 1.5 L/ day or 500 cc/meal    Electronically signed by  EMILY Paula CNP                      Sincerely,        EMILY Paula CNP

## 2018-01-10 NOTE — PROGRESS NOTES
[facility]    Chief Complaint   Patient presents with     Nursing Home Acute       HPI:    Francisco Mason is a 89 year old  (6/18/1920), who is being seen today for an episodic care visit at [facility] .  HPI information obtained from: facility chart records, facility staff, patient report and [facility] chart review.  Also spent 15+ minutes with son/DARIUSZ Carlos on the phone giving updates.     We were called to Mrs. Mason's room due to RN reporting she has not been eating/drinking much, not taking her pills.  Also following up to last weeks c/o abdominal pain.  Mrs. Mason reports she feels well.  She initially reported her abdominal pain as gone/resolved.  But with ongoing questioning she did admit it still hurts but not as much as last week.  She continues to deny N/V or other issues.  When we asked why she is not eating/drinking or taking her medications, she denies that and reports she has been taking them.  She does admit to not having an appetite, but has no other complaints.     ALLERGIES: Iodine; Levaquin [levofloxacin]; Penicillins; Plasticized base [bht-mo-polyethylene]; and Sulfa drugs  Past Medical, Surgical, Family and Social History reviewed and updated in [facility].    Current Outpatient Prescriptions   Medication Sig Dispense Refill     polyethylene glycol (MIRALAX/GLYCOLAX) Packet Take 17 g by mouth daily       olopatadine (PATANOL) 0.1 % ophthalmic solution Place 1 drop Into the left eye 2 times daily as needed for allergies       AmLODIPine Besylate (NORVASC PO) Take 5 mg by mouth daily       nystatin (MYCOSTATIN) 373880 UNIT/GM POWD Apply topically 2 times daily as needed 60 g 5     Acetaminophen (TYLENOL PO) Take 1,000 mg by mouth every 6 hours as needed for mild pain or fever       brimonidine (ALPHAGAN) 0.2 % ophthalmic solution Place 1 drop into the right eye 2 times daily 1 Bottle 11     warfarin (COUMADIN) 1 MG tablet Take 3.5 mg by mouth As directed per INR         phenol (CHLORASEPTIC) 1.4 % LIQD Take 2 sprays by mouth every 3 hours as needed for sore throat       LISINOPRIL PO Take 20 mg by mouth daily        VITAMIN D, CHOLECALCIFEROL, PO Take 1,000 Units by mouth daily       senna-docusate (SENOKOT-S;PERICOLACE) 8.6-50 MG per tablet Take 3 tablets by mouth At Bedtime        diltiazem (DILT-XR) 120 MG 24 hr ER capsule Take 120 mg by mouth daily       Acetaminophen (TYLENOL PO) Take 1,000 mg by mouth 2 times daily        Medications reviewed:  Medications reconciled to facility chart and changes were made to reflect current medications as identified as above med list. Below are the changes that were made:   Medications stopped since last EPIC medication reconciliation:   Medications Discontinued During This Encounter   Medication Reason     polyethylene glycol (MIRALAX/GLYCOLAX) packet        Medications started since last [facility] medication reconciliation:  Orders Placed This Encounter   Medications     polyethylene glycol (MIRALAX/GLYCOLAX) Packet     Sig: Take 17 g by mouth daily       REVIEW OF SYSTEMS:  7 point ROS done including, light headedness/dizziness, fever/chills, pain, Resp, CV, GI, and  and is negative other than noted in HPI.    Physical Exam:  /74  Pulse 57  Temp 98  F (36.7  C)  Resp 18  Wt 161 lb (73 kg)  SpO2 95%  BMI 27.64 kg/m2     GENERAL APPEARANCE:  Elderly female resting in bed, NAD, non-toxic.  ENT:  Mouth and oropharynx normal, moist mucous membranes.   RESP:  Lungs CTA.  Regular relaxed breathing effort.  No cough.   CV: S1/S2 no murmur or rubs.  Irregular-irregular rhythm and rate.    ABDOMEN: Protuberant with mild tenderness with deep palpation of the RLE and RUQ, Wang sign negative, RLE was more tender.  No tenderness on Left side.  Abd was soft, with active bowel sounds.  No guarding, rigidity, or rebound tenderness.  EXTREMITIES:  L>R LE edema, Left 2+, Right 1+ no calf tenderness, ongoing/chronic.   PSYCH: Alert to self,  somewhat to situation.  Moderate degree of cognitive/memory impairment. Appears stable/unchanged.       Recent Labs:  I have personally reviewed labs.     Assessment/Plan:  Abdominal pain, Right sided lower>upper, unclear etiology  Last week her R sided abdominal pain was unclear.  Labs / abd labs were WNL's.  Today pain/tenderness is not as much as last week but is still present.  She has been having BM's since a few days of increased laxatives.  Etiology into ongoing abd pain is still unclear.  Unclear if it's related to her not eating/drinking taking pills or not.  In speaking with son/Terrance on the phone.  He does not want to pursue any further workup at this time.  Reports he will be in next week and stop by so we can see her together.    -Will only clinically monitor at this time.     Alzheimer's dementia without behavioral disturbance, unspecified timing of dementia onset  Frail elderly  She continues to deny depression.  She does endorse she wishes she could be with family, reports she is bored a lot, but denies depression.  She is not on a anti depressant.  Will not start one now but will consider next week after meeting son and assessing together.   -Clinically monitor at this time.     Orders:  1. Please offer pills 2-3 times a day, if she refuses and encourage taking.  2. Encourage PO intake, goal 1.5 L/ day or 500 cc/meal    Electronically signed by  EMILY Paula CNP

## 2018-01-22 PROBLEM — R06.2 WHEEZING: Status: ACTIVE | Noted: 2018-01-01

## 2018-01-22 PROBLEM — J06.9 VIRAL UPPER RESPIRATORY TRACT INFECTION: Status: ACTIVE | Noted: 2018-01-01

## 2018-01-22 PROBLEM — R05.9 COUGH: Status: ACTIVE | Noted: 2018-01-01

## 2018-01-22 NOTE — PROGRESS NOTES
[facility]    Chief Complaint   Patient presents with     Nursing Home Acute       HPI:    Francisco Mason is a 89 year old  (6/18/1920), who is being seen today for an episodic care visit at [facility] .  HPI information obtained from: facility chart records, facility staff, patient report and [facility] chart review.    Asked to see Mrs. Mason today due to RN reporting respiratory concerns.  In meeting Mrs. Mason she reports she has a annoying cough, but denies any sputum, no SOB/POWELL.  She endorses she still has occasional upset stomach but can not give me much details.  She denies runny nose, no general body aches, no other complaints.  Her cognitive impairment makes her a poor historian/, but does answer ROS questions.     ALLERGIES: Iodine; Levaquin [levofloxacin]; Penicillins; Plasticized base [bht-mo-polyethylene]; and Sulfa drugs  Past Medical, Surgical, Family and Social History reviewed and updated in [facility].    Current Outpatient Prescriptions   Medication Sig Dispense Refill     Ondansetron HCl (ZOFRAN PO) Take 4 mg by mouth every 6 hours as needed for nausea or vomiting       ipratropium - albuterol 0.5 mg/2.5 mg/3 mL (DUONEB) 0.5-2.5 (3) MG/3ML neb solution Take 1 vial by nebulization 3 times daily       albuterol (2.5 MG/3ML) 0.083% neb solution Take 1 vial by nebulization every 2 hours as needed for shortness of breath / dyspnea or wheezing       guaiFENesin (ROBITUSSIN) 100 MG/5ML SYRP Take 10 mLs by mouth every 4 hours as needed for cough       polyethylene glycol (MIRALAX/GLYCOLAX) Packet Take 17 g by mouth daily       olopatadine (PATANOL) 0.1 % ophthalmic solution Place 1 drop Into the left eye 2 times daily as needed for allergies       AmLODIPine Besylate (NORVASC PO) Take 5 mg by mouth daily       nystatin (MYCOSTATIN) 612333 UNIT/GM POWD Apply topically 2 times daily as needed 60 g 5     Acetaminophen (TYLENOL PO) Take 1,000 mg by mouth every 6 hours as  needed for mild pain or fever       brimonidine (ALPHAGAN) 0.2 % ophthalmic solution Place 1 drop into the right eye 2 times daily 1 Bottle 11     warfarin (COUMADIN) 1 MG tablet Take 3.5 mg by mouth As directed per INR        phenol (CHLORASEPTIC) 1.4 % LIQD Take 2 sprays by mouth every 3 hours as needed for sore throat       LISINOPRIL PO Take 20 mg by mouth daily        VITAMIN D, CHOLECALCIFEROL, PO Take 1,000 Units by mouth daily       senna-docusate (SENOKOT-S;PERICOLACE) 8.6-50 MG per tablet Take 3 tablets by mouth At Bedtime        diltiazem (DILT-XR) 120 MG 24 hr ER capsule Take 120 mg by mouth daily       Acetaminophen (TYLENOL PO) Take 1,000 mg by mouth 2 times daily        Medications reviewed:  Medications reconciled to facility chart and changes were made to reflect current medications as identified as above med list. Below are the changes that were made:   Medications stopped since last EPIC medication reconciliation:   There are no discontinued medications.    Medications started since last [facility] medication reconciliation:  No orders of the defined types were placed in this encounter.    REVIEW OF SYSTEMS:  7 point ROS done including, light headedness/dizziness, fever/chills, pain, Resp, CV, GI, and  and is negative other than noted in HPI.      Physical Exam:  /80  Pulse 80  Temp 97.3  F (36.3  C)  Resp 18  Wt 156 lb (70.8 kg)  SpO2 95%  BMI 26.78 kg/m2     GENERAL APPEARANCE:  Elderly female resting in bed, NAD, non-toxic.  ENT:  Mouth and oropharynx normal, moist mucous membranes.   RESP:  Lungs have mild scattered expiratory wheezing bilaterally, no crackles.  Regular relaxed breathing effort.  Mild non-productive cough.   CV: S1/S2 no murmur or rubs.  Irregular-irregular rhythm and rate.    ABDOMEN: Protuberant with mild tenderness with deep palpation of the RLE and RUQ, Wang sign negative.  No tenderness on Left side.  Abd was soft, with active bowel sounds.  No guarding,  rigidity, or rebound tenderness.  EXTREMITIES:  L>R LE edema, Left 2+, Right 1+ no calf tenderness, ongoing/chronic.   PSYCH: Alert to self, somewhat to situation.  Moderate degree of cognitive/memory impairment. Appears stable/unchanged.     Recent Labs:  All labs reviewed, none recent.    Assessment/Plan:  Viral upper respiratory tract infection  Cough, non productive  Wheezing  Mrs. Mason reports non-productive cough, has wheezing and fatigue which has been consistent with a URI that has been common in the facility.  We do not have any known Influ cases and she does not have constitutional symptoms of Influ.  Suspect this is a viral URI.    -Start DuoNeb's and PRN Albuterol nebs.   -Start Guaifenesin cough syrup.   -Sent swab for Influ for completeness.     Alzheimer's dementia without behavioral disturbance, unspecified timing of dementia onset  Mrs. Mason has a moderate degree of cognitive/memory impairment and although she can report ROS questions, she is a poor historian and can not give a history on how she felt yesterday.  Appears stable/unchanged.     Frail elderly    Orders:  1. Send swab for Influenza A & B  Dx URI/Cough  2. Start duoneb TID x 10 days due to wheezing  3. Start albuterol neb po Q 2 Hr prn for SOB/ POWELL  4. Start guaifenesin 100 mg/5ml - give 5 ml po q 4 hr prn cough  5. Start ondansetron 4 mg po q 6 hr prn for nausea    Electronically signed by  EMILY Paula CNP

## 2018-01-22 NOTE — LETTER
1/22/2018        RE: Francisco Mason  [address]  [place] [address]        [facility]    Chief Complaint   Patient presents with     Nursing Home Acute       HPI:    Francisco Mason is a 89 year old  (6/18/1920), who is being seen today for an episodic care visit at [facility] .  HPI information obtained from: facility chart records, facility staff, patient report and [facility] chart review.    Asked to see Mrs. Mason today due to RN reporting respiratory concerns.  In meeting Mrs. Mason she reports she has a annoying cough, but denies any sputum, no SOB/POWELL.  She endorses she still has occasional upset stomach but can not give me much details.  She denies runny nose, no general body aches, no other complaints.  Her cognitive impairment makes her a poor historian/, but does answer ROS questions.     ALLERGIES: Iodine; Levaquin [levofloxacin]; Penicillins; Plasticized base [bht-mo-polyethylene]; and Sulfa drugs  Past Medical, Surgical, Family and Social History reviewed and updated in [facility].    Current Outpatient Prescriptions   Medication Sig Dispense Refill     Ondansetron HCl (ZOFRAN PO) Take 4 mg by mouth every 6 hours as needed for nausea or vomiting       ipratropium - albuterol 0.5 mg/2.5 mg/3 mL (DUONEB) 0.5-2.5 (3) MG/3ML neb solution Take 1 vial by nebulization 3 times daily       albuterol (2.5 MG/3ML) 0.083% neb solution Take 1 vial by nebulization every 2 hours as needed for shortness of breath / dyspnea or wheezing       guaiFENesin (ROBITUSSIN) 100 MG/5ML SYRP Take 10 mLs by mouth every 4 hours as needed for cough       polyethylene glycol (MIRALAX/GLYCOLAX) Packet Take 17 g by mouth daily       olopatadine (PATANOL) 0.1 % ophthalmic solution Place 1 drop Into the left eye 2 times daily as needed for allergies       AmLODIPine Besylate (NORVASC PO) Take 5 mg by mouth daily       nystatin (MYCOSTATIN) [id number] UNIT/GM POWD Apply topically  2 times daily as needed 60 g 5     Acetaminophen (TYLENOL PO) Take 1,000 mg by mouth every 6 hours as needed for mild pain or fever       brimonidine (ALPHAGAN) 0.2 % ophthalmic solution Place 1 drop into the right eye 2 times daily 1 Bottle 11     warfarin (COUMADIN) 1 MG tablet Take 3.5 mg by mouth As directed per INR        phenol (CHLORASEPTIC) 1.4 % LIQD Take 2 sprays by mouth every 3 hours as needed for sore throat       LISINOPRIL PO Take 20 mg by mouth daily        VITAMIN D, CHOLECALCIFEROL, PO Take 1,000 Units by mouth daily       senna-docusate (SENOKOT-S;PERICOLACE) 8.6-50 MG per tablet Take 3 tablets by mouth At Bedtime        diltiazem (DILT-XR) 120 MG 24 hr ER capsule Take 120 mg by mouth daily       Acetaminophen (TYLENOL PO) Take 1,000 mg by mouth 2 times daily        Medications reviewed:  Medications reconciled to facility chart and changes were made to reflect current medications as identified as above med list. Below are the changes that were made:   Medications stopped since last EPIC medication reconciliation:   There are no discontinued medications.    Medications started since last [facility] medication reconciliation:  No orders of the defined types were placed in this encounter.    REVIEW OF SYSTEMS:  7 point ROS done including, light headedness/dizziness, fever/chills, pain, Resp, CV, GI, and  and is negative other than noted in HPI.      Physical Exam:  /80  Pulse 80  Temp 97.3  F (36.3  C)  Resp 18  Wt 156 lb (70.8 kg)  SpO2 95%  BMI 26.78 kg/m2     GENERAL APPEARANCE:  Elderly female resting in bed, NAD, non-toxic.  ENT:  Mouth and oropharynx normal, moist mucous membranes.   RESP:  Lungs have mild scattered expiratory wheezing bilaterally, no crackles.  Regular relaxed breathing effort.  Mild non-productive cough.   CV: S1/S2 no murmur or rubs.  Irregular-irregular rhythm and rate.    ABDOMEN: Protuberant with mild tenderness with deep palpation of the RLE and RUQ, Wang sign  negative.  No tenderness on Left side.  Abd was soft, with active bowel sounds.  No guarding, rigidity, or rebound tenderness.  EXTREMITIES:  L>R LE edema, Left 2+, Right 1+ no calf tenderness, ongoing/chronic.   PSYCH: Alert to self, somewhat to situation.  Moderate degree of cognitive/memory impairment. Appears stable/unchanged.     Recent Labs:  All labs reviewed, none recent.    Assessment/Plan:  Viral upper respiratory tract infection  Cough, non productive  Wheezing  Mrs. Mason reports non-productive cough, has wheezing and fatigue which has been consistent with a URI that has been common in the facility.  We do not have any known Influ cases and she does not have constitutional symptoms of Influ.  Suspect this is a viral URI.    -Start DuoNeb's and PRN Albuterol nebs.   -Start Guaifenesin cough syrup.   -Sent swab for Influ for completeness.     Alzheimer's dementia without behavioral disturbance, unspecified timing of dementia onset  Mrs. Mason has a moderate degree of cognitive/memory impairment and although she can report ROS questions, she is a poor historian and can not give a history on how she felt yesterday.  Appears stable/unchanged.     Frail elderly    Orders:  1. Send swab for Influenza A & B  Dx URI/Cough  2. Start duoneb TID x 10 days due to wheezing  3. Start albuterol neb po Q 2 Hr prn for SOB/ POWELL  4. Start guaifenesin 100 mg/5ml - give 5 ml po q 4 hr prn cough  5. Start ondansetron 4 mg po q 6 hr prn for nausea    Electronically signed by  EMILY Paula CNP                      Sincerely,        EMILY Paula CNP

## 2018-01-23 PROBLEM — R63.8 POOR FLUID INTAKE: Status: ACTIVE | Noted: 2018-01-01

## 2018-01-23 PROBLEM — R79.1 SUPRATHERAPEUTIC INR: Status: ACTIVE | Noted: 2018-01-01

## 2018-01-23 PROBLEM — M79.10 MYALGIA: Status: ACTIVE | Noted: 2018-01-01

## 2018-01-23 NOTE — LETTER
"    1/23/2018        RE: Francisco Mason  [address]  [facility] [address]        [place] [facility]    Chief Complaint   Patient presents with     Nursing Home Acute       HPI:    Francisco Mason is a 89 year old  (6/18/1920), who is being seen today for an episodic care visit at [facility] .  HPI information obtained from: facility chart records, facility staff, patient report and [facility] chart review.  Also spoke with son/POMARAL Carlos on the phone.      Was called to Mrs. Mason room by RN due to ongoing concern she is not doing well.  Today Mrs. Mason continues to endorse \"I don't feel well\"  She endorses her SOB and cough are slightly improved from yesterday but today she reports \"I just hurt all over, I'm achy all over\"  And she reports her abdominal tenderness from a few weeks ago is back, today more Right upper than lower, which is different from a couple weeks ago.  She has no other complaints.     ALLERGIES: Iodine; Levaquin [levofloxacin]; Penicillins; Plasticized base [bht-mo-polyethylene]; and Sulfa drugs  Past Medical, Surgical, Family and Social History reviewed and updated in [facility].    Current Outpatient Prescriptions   Medication Sig Dispense Refill     Oseltamivir Phosphate (TAMIFLU PO) Take 30 mg by mouth 2 times daily       Ondansetron HCl (ZOFRAN PO) Take 4 mg by mouth every 6 hours as needed for nausea or vomiting       ipratropium - albuterol 0.5 mg/2.5 mg/3 mL (DUONEB) 0.5-2.5 (3) MG/3ML neb solution Take 1 vial by nebulization 3 times daily       albuterol (2.5 MG/3ML) 0.083% neb solution Take 1 vial by nebulization every 2 hours as needed for shortness of breath / dyspnea or wheezing       guaiFENesin (ROBITUSSIN) 100 MG/5ML SYRP Take 10 mLs by mouth every 4 hours as needed for cough       polyethylene glycol (MIRALAX/GLYCOLAX) Packet Take 17 g by mouth daily       olopatadine (PATANOL) 0.1 % ophthalmic solution Place 1 drop Into the left eye 2 times " daily as needed for allergies       AmLODIPine Besylate (NORVASC PO) Take 5 mg by mouth daily       nystatin (MYCOSTATIN) 589043 UNIT/GM POWD Apply topically 2 times daily as needed 60 g 5     Acetaminophen (TYLENOL PO) Take 1,000 mg by mouth every 6 hours as needed for mild pain or fever       brimonidine (ALPHAGAN) 0.2 % ophthalmic solution Place 1 drop into the right eye 2 times daily 1 Bottle 11     warfarin (COUMADIN) 1 MG tablet Take 3.5 mg by mouth As directed per INR        phenol (CHLORASEPTIC) 1.4 % LIQD Take 2 sprays by mouth every 3 hours as needed for sore throat       LISINOPRIL PO Take 20 mg by mouth daily        VITAMIN D, CHOLECALCIFEROL, PO Take 1,000 Units by mouth daily       senna-docusate (SENOKOT-S;PERICOLACE) 8.6-50 MG per tablet Take 3 tablets by mouth At Bedtime        diltiazem (DILT-XR) 120 MG 24 hr ER capsule Take 120 mg by mouth daily       Acetaminophen (TYLENOL PO) Take 1,000 mg by mouth 2 times daily        Medications reviewed:  Medications reconciled to facility chart and changes were made to reflect current medications as identified as above med list. Below are the changes that were made:   Medications stopped since last EPIC medication reconciliation:   There are no discontinued medications.    Medications started since last [facility] medication reconciliation:  No orders of the defined types were placed in this encounter.    REVIEW OF SYSTEMS:  7 point ROS done including, light headedness/dizziness, fever/chills, pain, Resp, CV, GI, and  and is negative other than noted in HPI.     Physical Exam:  /71  Pulse 84  Temp 98.1  F (36.7  C)  Resp 24  Wt 148 lb (67.1 kg)  SpO2 95%  BMI 25.4 kg/m2     GENERAL APPEARANCE:  Elderly female resting in bed, appears fatigued, but NAD, non-toxic.  ENT:  Mouth and oropharynx normal, moist mucous membranes.   RESP:  Lungs have mild scattered expiratory wheezing bilaterally, no crackles, improved or better than yesterday.  Regular  relaxed breathing effort.  Mild non-productive cough.   CV: S1/S2 no murmur or rubs.  Irregular-irregular rhythm and rate.    ABDOMEN: Protuberant with mild tenderness with deep palpation of the RLE and RUQ, today RUQ>RLQ.  Wang sign negative.  No tenderness on Left side.  Abd soft, with active bowel sounds.  No guarding, rigidity, or rebound tenderness.  EXTREMITIES:  L>R LE edema, Left 2+, Right 1+ no calf tenderness, ongoing/chronic.   PSYCH: Alert to self, somewhat to situation.  Moderate degree of cognitive/memory impairment. Appears stable/unchanged.    Recent Labs: None recent.     Assessment/Plan:  Viral upper respiratory tract infection  Cough, non productive  Wheezing  Myalgia  Poor fluid intake  Yesterday she endorses findings consistent with a URI we have common here in the facility.  Today she reports myalgia/achyness which is new.  Flu swab was not sent yesterday, unclear why.  She continues to intermittently take her meds due to her intermittent abdominal discomfort, PO intake remains intermittent as well.  I do note weight is down.  She does appear euvolemic however.      I spent 15+ minutes with son/Terrance on the phone and updated him and shared my concern that if Mrs. Mason does not start eating/drinking more, she could continue to decline.  Currently Mrs. Mason has Do Not Hospitalize on her care form.  Today Terrance reported that is correct and does not want to change it.  He asked we do what we can here at the TCU and he is aware she may decline further and possibly die, as we do not have a clear etiology of every thing.  He reports understand and also noted her being comfortable is the primary goal, and if were not able to do that we should call as he would alter plan if we can not make her comfortable.    -Asked RN to send Influ A+B swab today.   -Started empiric Tamiflu x 5 days.   -Ordered UA/UC to r/o urine infection etiology.   -Ordered CBC/BMP/CXR for further workup was well.     Abdominal pain,  Right sided lower>upper, unclear etiology  Continues to have intermittent Right sided abdominal tenderness.  Some times upper>lower, sometimes lower>upper.  Prior labs/abd workup did not indicate etiology.  She has been refusing her medications and eating less and less lately.  Etiology unclear, but malignancy has not been ruled out.     Alzheimer's dementia without behavioral disturbance, unspecified timing of dementia onset  Frail elderly  Cognitive/memory impairment are stable/unchanged.  In speaking with Terrance/DARIUSZ he notes he is aware she is 89, notes she does not get out of bed much, and with prior conversations with both Mrs. Mason and Terrance/DARIUSZ they both have indicated they do not desire much workup or aggressive cares in the past.      Hypertrophy of breast, Right, with unclear etiology   This was called to our attention roughly two months ago, as above, she/POA did not want to pursue workup, malignancy is high on the deferential but unknown etiology.  Thus query if that could be etiology to the other issues noted above, but unclear.     Atrial fibrillation, unspecified type (H)  Supratherapeutic INR  Late in the day Mrs. Mason's INR came back at 9.11.  She is not on any new antibiotics, no overt bleeding, hemodynamically stable.  Query accuracy of this, however, in setting of minimal PO intake it's possible she has become Vit K deficient and this is accurate.   -Stopped Warfarin.   -Will recheck INR tomorrow with other labs.     Orders:  1. Get CXR ap view dx abd pain, nausea and lethargy  2. Get CBC w/ diff and BMP, UA and UC Dx abd pain, nausea and lethargy  3. Start Tamiflu 30 mg po BID x 5 days, start today Dx Uri  4. Hold warfarin for now  5. Next INR 1/24 Dx Afib    Total time spent with patient visit at the [facility] nursing facility was 35 min including patient visit and review of past records along with phone conversation with POA/son.  Greater than 50% of total time spent with counseling and coordinating  care due to complex medical case, review of HPI, development of POC, patient education and review of POC with pt/son.      Electronically signed by  EMILY Paula CNP                      Sincerely,        EMILY Paula CNP

## 2018-01-23 NOTE — PROGRESS NOTES
"[place] [facility]    Chief Complaint   Patient presents with     Nursing Home Acute       HPI:    Francisco Mason is a 89 year old  (6/18/1920), who is being seen today for an episodic care visit at [facility] .  HPI information obtained from: facility chart records, facility staff, patient report and [facility] chart review.  Also spoke with son/DARIUSZ Carlos on the phone.      Was called to Mrs. Mason room by RN due to ongoing concern she is not doing well.  Today Mrs. Mason continues to endorse \"I don't feel well\"  She endorses her SOB and cough are slightly improved from yesterday but today she reports \"I just hurt all over, I'm achy all over\"  And she reports her abdominal tenderness from a few weeks ago is back, today more Right upper than lower, which is different from a couple weeks ago.  She has no other complaints.     ALLERGIES: Iodine; Levaquin [levofloxacin]; Penicillins; Plasticized base [bht-mo-polyethylene]; and Sulfa drugs  Past Medical, Surgical, Family and Social History reviewed and updated in [facility].    Current Outpatient Prescriptions   Medication Sig Dispense Refill     Oseltamivir Phosphate (TAMIFLU PO) Take 30 mg by mouth 2 times daily       Ondansetron HCl (ZOFRAN PO) Take 4 mg by mouth every 6 hours as needed for nausea or vomiting       ipratropium - albuterol 0.5 mg/2.5 mg/3 mL (DUONEB) 0.5-2.5 (3) MG/3ML neb solution Take 1 vial by nebulization 3 times daily       albuterol (2.5 MG/3ML) 0.083% neb solution Take 1 vial by nebulization every 2 hours as needed for shortness of breath / dyspnea or wheezing       guaiFENesin (ROBITUSSIN) 100 MG/5ML SYRP Take 10 mLs by mouth every 4 hours as needed for cough       polyethylene glycol (MIRALAX/GLYCOLAX) Packet Take 17 g by mouth daily       olopatadine (PATANOL) 0.1 % ophthalmic solution Place 1 drop Into the left eye 2 times daily as needed for allergies       AmLODIPine Besylate (NORVASC PO) Take 5 mg by mouth daily       " nystatin (MYCOSTATIN) 283278 UNIT/GM POWD Apply topically 2 times daily as needed 60 g 5     Acetaminophen (TYLENOL PO) Take 1,000 mg by mouth every 6 hours as needed for mild pain or fever       brimonidine (ALPHAGAN) 0.2 % ophthalmic solution Place 1 drop into the right eye 2 times daily 1 Bottle 11     warfarin (COUMADIN) 1 MG tablet Take 3.5 mg by mouth As directed per INR        phenol (CHLORASEPTIC) 1.4 % LIQD Take 2 sprays by mouth every 3 hours as needed for sore throat       LISINOPRIL PO Take 20 mg by mouth daily        VITAMIN D, CHOLECALCIFEROL, PO Take 1,000 Units by mouth daily       senna-docusate (SENOKOT-S;PERICOLACE) 8.6-50 MG per tablet Take 3 tablets by mouth At Bedtime        diltiazem (DILT-XR) 120 MG 24 hr ER capsule Take 120 mg by mouth daily       Acetaminophen (TYLENOL PO) Take 1,000 mg by mouth 2 times daily        Medications reviewed:  Medications reconciled to facility chart and changes were made to reflect current medications as identified as above med list. Below are the changes that were made:   Medications stopped since last EPIC medication reconciliation:   There are no discontinued medications.    Medications started since last [facility] medication reconciliation:  No orders of the defined types were placed in this encounter.    REVIEW OF SYSTEMS:  7 point ROS done including, light headedness/dizziness, fever/chills, pain, Resp, CV, GI, and  and is negative other than noted in HPI.     Physical Exam:  /71  Pulse 84  Temp 98.1  F (36.7  C)  Resp 24  Wt 148 lb (67.1 kg)  SpO2 95%  BMI 25.4 kg/m2     GENERAL APPEARANCE:  Elderly female resting in bed, appears fatigued, but NAD, non-toxic.  ENT:  Mouth and oropharynx normal, moist mucous membranes.   RESP:  Lungs have mild scattered expiratory wheezing bilaterally, no crackles, improved or better than yesterday.  Regular relaxed breathing effort.  Mild non-productive cough.   CV: S1/S2 no murmur or rubs.  Irregular-irregular  rhythm and rate.    ABDOMEN: Protuberant with mild tenderness with deep palpation of the RLE and RUQ, today RUQ>RLQ.  Wang sign negative.  No tenderness on Left side.  Abd soft, with active bowel sounds.  No guarding, rigidity, or rebound tenderness.  EXTREMITIES:  L>R LE edema, Left 2+, Right 1+ no calf tenderness, ongoing/chronic.   PSYCH: Alert to self, somewhat to situation.  Moderate degree of cognitive/memory impairment. Appears stable/unchanged.    Recent Labs: None recent.     Assessment/Plan:  Viral upper respiratory tract infection  Cough, non productive  Wheezing  Myalgia  Poor fluid intake  Yesterday she endorses findings consistent with a URI we have common here in the facility.  Today she reports myalgia/achyness which is new.  Flu swab was not sent yesterday, unclear why.  She continues to intermittently take her meds due to her intermittent abdominal discomfort, PO intake remains intermittent as well.  I do note weight is down.  She does appear euvolemic however.      I spent 15+ minutes with son/Terrance on the phone and updated him and shared my concern that if Mrs. Mason does not start eating/drinking more, she could continue to decline.  Currently Mrs. Mason has Do Not Hospitalize on her care form.  Today Terrance reported that is correct and does not want to change it.  He asked we do what we can here at the TCU and he is aware she may decline further and possibly die, as we do not have a clear etiology of every thing.  He reports understand and also noted her being comfortable is the primary goal, and if were not able to do that we should call as he would alter plan if we can not make her comfortable.    -Asked RN to send Influ A+B swab today.   -Started empiric Tamiflu x 5 days.   -Ordered UA/UC to r/o urine infection etiology.   -Ordered CBC/BMP/CXR for further workup was well.     Abdominal pain, Right sided lower>upper, unclear etiology  Continues to have intermittent Right sided abdominal  tenderness.  Some times upper>lower, sometimes lower>upper.  Prior labs/abd workup did not indicate etiology.  She has been refusing her medications and eating less and less lately.  Etiology unclear, but malignancy has not been ruled out.     Alzheimer's dementia without behavioral disturbance, unspecified timing of dementia onset  Frail elderly  Cognitive/memory impairment are stable/unchanged.  In speaking with Terrance/DARIUSZ he notes he is aware she is 89, notes she does not get out of bed much, and with prior conversations with both Mrs. Mason and Terrance/DARIUSZ they both have indicated they do not desire much workup or aggressive cares in the past.      Hypertrophy of breast, Right, with unclear etiology   This was called to our attention roughly two months ago, as above, she/POA did not want to pursue workup, malignancy is high on the deferential but unknown etiology.  Thus query if that could be etiology to the other issues noted above, but unclear.     Atrial fibrillation, unspecified type (H)  Supratherapeutic INR  Late in the day Mrs. Mason's INR came back at 9.11.  She is not on any new antibiotics, no overt bleeding, hemodynamically stable.  Query accuracy of this, however, in setting of minimal PO intake it's possible she has become Vit K deficient and this is accurate.   -Stopped Warfarin.   -Will recheck INR tomorrow with other labs.     Orders:  1. Get CXR ap view dx abd pain, nausea and lethargy  2. Get CBC w/ diff and BMP, UA and UC Dx abd pain, nausea and lethargy  3. Start Tamiflu 30 mg po BID x 5 days, start today Dx Uri  4. Hold warfarin for now  5. Next INR 1/24 Dx Afib    Total time spent with patient visit at the skilled nursing facility was 35 min including patient visit and review of past records along with phone conversation with POA/son.  Greater than 50% of total time spent with counseling and coordinating care due to complex medical case, review of HPI, development of POC, patient education and  review of POC with pt/son.      Electronically signed by  EMILY Paula CNP

## 2018-01-24 PROBLEM — J18.9 PNEUMONIA OF RIGHT LOWER LOBE DUE TO INFECTIOUS ORGANISM: Status: ACTIVE | Noted: 2018-01-01

## 2018-01-24 NOTE — LETTER
1/24/2018        RE: Francisco Mason  [address]  [facility] [address]        [place] [facility]    Chief Complaint   Patient presents with     Nursing Home Acute       HPI:    Francisco Mason is a 89 year old  (6/18/1920), who is being seen today for an episodic care visit at [facility] .  HPI information obtained from: facility chart records, facility staff, patient report and [facility] chart review.  Also called Terrance/DARIUSZ and gave updates.     We continue to follow Mrs. Mason closely due to acute illness.  Today she reports she feels the same, endorses SOB, non-productive cough, myalgia and intermittent Right sided abdominal pain.  RN reports she has had minimal PO intake last two days, urine is concentrated.  RN reports minimal urine output, they have not been able to get a UA.  Mrs. Mason reports she thinks her breathing/cough are slightly improved but still present.  No other new complaints.     ALLERGIES: Iodine; Levaquin [levofloxacin]; Penicillins; Plasticized base [bht-mo-polyethylene]; and Sulfa drugs  Past Medical, Surgical, Family and Social History reviewed and updated in [facility].    Current Outpatient Prescriptions   Medication Sig Dispense Refill     AZITHROMYCIN PO Take 500 mg by mouth once       [START ON 1/25/2018] AZITHROMYCIN PO Take 250 mg by mouth daily       Ondansetron HCl (ZOFRAN PO) Take 4 mg by mouth every 6 hours as needed for nausea or vomiting       ipratropium - albuterol 0.5 mg/2.5 mg/3 mL (DUONEB) 0.5-2.5 (3) MG/3ML neb solution Take 1 vial by nebulization 3 times daily       albuterol (2.5 MG/3ML) 0.083% neb solution Take 1 vial by nebulization every 2 hours as needed for shortness of breath / dyspnea or wheezing       guaiFENesin (ROBITUSSIN) 100 MG/5ML SYRP Take 10 mLs by mouth every 4 hours as needed for cough       polyethylene glycol (MIRALAX/GLYCOLAX) Packet Take 17 g by mouth daily       olopatadine (PATANOL) 0.1 % ophthalmic  solution Place 1 drop Into the left eye 2 times daily as needed for allergies       AmLODIPine Besylate (NORVASC PO) Take 5 mg by mouth daily       nystatin (MYCOSTATIN) 065583 UNIT/GM POWD Apply topically 2 times daily as needed 60 g 5     Acetaminophen (TYLENOL PO) Take 1,000 mg by mouth every 6 hours as needed for mild pain or fever       brimonidine (ALPHAGAN) 0.2 % ophthalmic solution Place 1 drop into the right eye 2 times daily 1 Bottle 11     warfarin (COUMADIN) 1 MG tablet Take 3.5 mg by mouth As directed per INR        phenol (CHLORASEPTIC) 1.4 % LIQD Take 2 sprays by mouth every 3 hours as needed for sore throat       LISINOPRIL PO Take 20 mg by mouth daily        VITAMIN D, CHOLECALCIFEROL, PO Take 1,000 Units by mouth daily       senna-docusate (SENOKOT-S;PERICOLACE) 8.6-50 MG per tablet Take 3 tablets by mouth At Bedtime        diltiazem (DILT-XR) 120 MG 24 hr ER capsule Take 120 mg by mouth daily       Acetaminophen (TYLENOL PO) Take 1,000 mg by mouth 2 times daily        Medications reviewed:  Medications reconciled to facility chart and changes were made to reflect current medications as identified as above med list. Below are the changes that were made:   Medications stopped since last EPIC medication reconciliation:   Medications Discontinued During This Encounter   Medication Reason     Oseltamivir Phosphate (TAMIFLU PO)        Medications started since last [facility] medication reconciliation:  No orders of the defined types were placed in this encounter.    REVIEW OF SYSTEMS:  7 point ROS done including, light headedness/dizziness, fever/chills, pain, Resp, CV, GI, and  and is negative other than noted in HPI.      Physical Exam:  /71  Pulse 84  Temp 98.1  F (36.7  C)  Resp 24  Wt 147 lb (66.7 kg)  SpO2 95%  BMI 25.23 kg/m2     GENERAL APPEARANCE:  Elderly female resting in bed, appears fatigued, but NAD.   ENT:  Mouth and oropharynx normal, dry mucous membranes.   RESP:  Lungs have  "mild scattered expiratory wheezing/coarseness bilaterally, no crackles.  Regular relaxed breathing effort.  Mild non-productive cough.   CV: S1/S2 no murmur or rubs.  Irregular-irregular rhythm and rate.    ABDOMEN: Protuberant with mild tenderness with deep palpation of the RLE and RUQ, today RUQ>RLQ.  No tenderness on Left side.  Abd soft, with active bowel sounds.  No guarding, rigidity, or rebound tenderness.  EXTREMITIES:  L>R LE edema, Left 2+, Right 1+ no calf tenderness, ongoing/chronic.   PSYCH: Alert to self, somewhat to situation.  Moderate degree of cognitive/memory impairment. Appears stable/unchanged.    Recent Labs: I have personally reviewed labs and images.   CXR notes: Opacity in the Right base, possible mild Right pleural effusion.    Flu Swab: Negative for Influ A/B.   CBC/BMP: Pending.  INR; 10.9    Assessment/Plan:  Pneumonia of right lower lobe due to infectious organism (H)  Viral upper respiratory tract infection  Cough, non productive  Wheezing  Myalgia  In the last 48-72 hours Mrs. Mason has not improved.  She continues to have SOB, cough, wheezing and myalgia.  SOB/Wheezing is improved and she reports the Neb's are helping, but she still is achy all over and \"I don't feel well\".  We started treatment for a URI but now it appears she has developed a superimposed pneumonia in the RLL as well.  We called the POA/son Terrance and he continued to endorse he would like us to do what we can, not go to hospital.  His main goal is her comfort.    -Continue DuoNeb's.   -Stopped Tamiflu due to negative Influ.   -Started Azithromycin.    -Ordered 1 L of LR due to dehydration.  -Cancelled UA due to minimal output and starting antibiotics any ways.   --If not improved in a couple days, consider adding Doxycycline and more IVF's.      Poor fluid intake  Supratherapeutic INR  PO intake has been poor the last 2-3 days per RN.  Yesterday INR came back in the 9+ range, no overt bleeding.  We stopped Warfarin and " today's repeat INR is 10.9.  Query if she is Vit K deficient due to poor PO intake.   -Will continue to hold Warfarin.   -Will give 2.5 mg of Vit K tonight.   -Repeat INR tomorrow.     Abdominal pain, Right sided lower>upper, unclear etiology  As noted newer Right sided Abd discomfort has been intermittent now for 2-3 weeks, unclear etiology, lab/xray unrevealing.  I query malignancy, but unclear.  She/family declined further workup.     Orders:  1. Today start IV and give 1 liter of LR over 6 hrs and faiza NP if not able to do by this afternoon  2. Stop Tamiflu  3. Start Azithromycin 500 mg po x 1 dose on 1/24 then 250 mg po qd day 2-5 stating 1/25  4. Call on call if not improved clinically by 1/26.    10+ minutes on phone spent with Terrance/DARIUSZ to give update, questions answered.     Electronically signed by  EMILY Paula CNP                      Sincerely,        EMILY Paula CNP

## 2018-01-24 NOTE — PROGRESS NOTES
[facility]    Chief Complaint   Patient presents with     Nursing Home Acute       HPI:    Francisco Mason is a 89 year old  (6/18/1920), who is being seen today for an episodic care visit at [facility] .  HPI information obtained from: facility chart records, facility staff, patient report and [facility] chart review.  Also called Terrance/DARIUSZ and gave updates.     We continue to follow Mrs. Mason closely due to acute illness.  Today she reports she feels the same, endorses SOB, non-productive cough, myalgia and intermittent Right sided abdominal pain.  RN reports she has had minimal PO intake last two days, urine is concentrated.  RN reports minimal urine output, they have not been able to get a UA.  Mrs. Mason reports she thinks her breathing/cough are slightly improved but still present.  No other new complaints.     ALLERGIES: Iodine; Levaquin [levofloxacin]; Penicillins; Plasticized base [bht-mo-polyethylene]; and Sulfa drugs  Past Medical, Surgical, Family and Social History reviewed and updated in [facility].    Current Outpatient Prescriptions   Medication Sig Dispense Refill     AZITHROMYCIN PO Take 500 mg by mouth once       [START ON 1/25/2018] AZITHROMYCIN PO Take 250 mg by mouth daily       Ondansetron HCl (ZOFRAN PO) Take 4 mg by mouth every 6 hours as needed for nausea or vomiting       ipratropium - albuterol 0.5 mg/2.5 mg/3 mL (DUONEB) 0.5-2.5 (3) MG/3ML neb solution Take 1 vial by nebulization 3 times daily       albuterol (2.5 MG/3ML) 0.083% neb solution Take 1 vial by nebulization every 2 hours as needed for shortness of breath / dyspnea or wheezing       guaiFENesin (ROBITUSSIN) 100 MG/5ML SYRP Take 10 mLs by mouth every 4 hours as needed for cough       polyethylene glycol (MIRALAX/GLYCOLAX) Packet Take 17 g by mouth daily       olopatadine (PATANOL) 0.1 % ophthalmic solution Place 1 drop Into the left eye 2 times daily as needed for allergies       AmLODIPine Besylate  (NORVASC PO) Take 5 mg by mouth daily       nystatin (MYCOSTATIN) 647549 UNIT/GM POWD Apply topically 2 times daily as needed 60 g 5     Acetaminophen (TYLENOL PO) Take 1,000 mg by mouth every 6 hours as needed for mild pain or fever       brimonidine (ALPHAGAN) 0.2 % ophthalmic solution Place 1 drop into the right eye 2 times daily 1 Bottle 11     warfarin (COUMADIN) 1 MG tablet Take 3.5 mg by mouth As directed per INR        phenol (CHLORASEPTIC) 1.4 % LIQD Take 2 sprays by mouth every 3 hours as needed for sore throat       LISINOPRIL PO Take 20 mg by mouth daily        VITAMIN D, CHOLECALCIFEROL, PO Take 1,000 Units by mouth daily       senna-docusate (SENOKOT-S;PERICOLACE) 8.6-50 MG per tablet Take 3 tablets by mouth At Bedtime        diltiazem (DILT-XR) 120 MG 24 hr ER capsule Take 120 mg by mouth daily       Acetaminophen (TYLENOL PO) Take 1,000 mg by mouth 2 times daily        Medications reviewed:  Medications reconciled to facility chart and changes were made to reflect current medications as identified as above med list. Below are the changes that were made:   Medications stopped since last EPIC medication reconciliation:   Medications Discontinued During This Encounter   Medication Reason     Oseltamivir Phosphate (TAMIFLU PO)        Medications started since last [facility] medication reconciliation:  No orders of the defined types were placed in this encounter.    REVIEW OF SYSTEMS:  7 point ROS done including, light headedness/dizziness, fever/chills, pain, Resp, CV, GI, and  and is negative other than noted in HPI.      Physical Exam:  /71  Pulse 84  Temp 98.1  F (36.7  C)  Resp 24  Wt 147 lb (66.7 kg)  SpO2 95%  BMI 25.23 kg/m2     GENERAL APPEARANCE:  Elderly female resting in bed, appears fatigued, but NAD.   ENT:  Mouth and oropharynx normal, dry mucous membranes.   RESP:  Lungs have mild scattered expiratory wheezing/coarseness bilaterally, no crackles.  Regular relaxed breathing effort.  " Mild non-productive cough.   CV: S1/S2 no murmur or rubs.  Irregular-irregular rhythm and rate.    ABDOMEN: Protuberant with mild tenderness with deep palpation of the RLE and RUQ, today RUQ>RLQ.  No tenderness on Left side.  Abd soft, with active bowel sounds.  No guarding, rigidity, or rebound tenderness.  EXTREMITIES:  L>R LE edema, Left 2+, Right 1+ no calf tenderness, ongoing/chronic.   PSYCH: Alert to self, somewhat to situation.  Moderate degree of cognitive/memory impairment. Appears stable/unchanged.    Recent Labs: I have personally reviewed labs and images.   CXR notes: Opacity in the Right base, possible mild Right pleural effusion.    Flu Swab: Negative for Influ A/B.   CBC/BMP: Pending.  INR; 10.9    Assessment/Plan:  Pneumonia of right lower lobe due to infectious organism (H)  Viral upper respiratory tract infection  Cough, non productive  Wheezing  Myalgia  In the last 48-72 hours Mrs. Mason has not improved.  She continues to have SOB, cough, wheezing and myalgia.  SOB/Wheezing is improved and she reports the Neb's are helping, but she still is achy all over and \"I don't feel well\".  We started treatment for a URI but now it appears she has developed a superimposed pneumonia in the RLL as well.  We called the POA/son Terrance and he continued to endorse he would like us to do what we can, not go to hospital.  His main goal is her comfort.    -Continue DuoNeb's.   -Stopped Tamiflu due to negative Influ.   -Started Azithromycin.    -Ordered 1 L of LR due to dehydration.  -Cancelled UA due to minimal output and starting antibiotics any ways.   --If not improved in a couple days, consider adding Doxycycline and more IVF's.      Poor fluid intake  Supratherapeutic INR  PO intake has been poor the last 2-3 days per RN.  Yesterday INR came back in the 9+ range, no overt bleeding.  We stopped Warfarin and today's repeat INR is 10.9.  Query if she is Vit K deficient due to poor PO intake.   -Will continue to " hold Warfarin.   -Will give 2.5 mg of Vit K tonight.   -Repeat INR tomorrow.     Abdominal pain, Right sided lower>upper, unclear etiology  As noted newer Right sided Abd discomfort has been intermittent now for 2-3 weeks, unclear etiology, lab/xray unrevealing.  I query malignancy, but unclear.  She/family declined further workup.     Orders:  1. Today start IV and give 1 liter of LR over 6 hrs and faiza NP if not able to do by this afternoon  2. Stop Tamiflu  3. Start Azithromycin 500 mg po x 1 dose on 1/24 then 250 mg po qd day 2-5 stating 1/25  4. Call on call if not improved clinically by 1/26.    10+ minutes on phone spent with Terrance/DARIUSZ to give update, questions answered.     Electronically signed by  EMILY Paula CNP

## 2018-01-25 PROBLEM — G30.9 ALZHEIMER'S DEMENTIA WITHOUT BEHAVIORAL DISTURBANCE, UNSPECIFIED TIMING OF DEMENTIA ONSET: Chronic | Status: ACTIVE | Noted: 2017-01-01

## 2018-01-25 PROBLEM — F02.80 ALZHEIMER'S DEMENTIA WITHOUT BEHAVIORAL DISTURBANCE, UNSPECIFIED TIMING OF DEMENTIA ONSET: Chronic | Status: ACTIVE | Noted: 2017-01-01

## 2018-01-25 PROBLEM — R05.9 COUGH: Status: RESOLVED | Noted: 2018-01-01 | Resolved: 2018-01-01

## 2018-01-25 PROBLEM — I10 ESSENTIAL HYPERTENSION: Status: ACTIVE | Noted: 2017-01-01

## 2018-01-25 PROBLEM — J39.2 THROAT MASS: Status: ACTIVE | Noted: 2018-01-01

## 2018-01-25 PROBLEM — G30.9 ALZHEIMER'S DEMENTIA WITHOUT BEHAVIORAL DISTURBANCE, UNSPECIFIED TIMING OF DEMENTIA ONSET: Status: ACTIVE | Noted: 2017-01-01

## 2018-01-25 PROBLEM — J18.9 PNEUMONIA OF RIGHT LOWER LOBE DUE TO INFECTIOUS ORGANISM: Status: RESOLVED | Noted: 2018-01-01 | Resolved: 2018-01-01

## 2018-01-25 PROBLEM — R06.2 WHEEZING: Status: RESOLVED | Noted: 2018-01-01 | Resolved: 2018-01-01

## 2018-01-25 PROBLEM — I10 ESSENTIAL HYPERTENSION: Chronic | Status: ACTIVE | Noted: 2017-01-01

## 2018-01-25 PROBLEM — R79.1 SUPRATHERAPEUTIC INR: Status: RESOLVED | Noted: 2018-01-01 | Resolved: 2018-01-01

## 2018-01-25 PROBLEM — J06.9 VIRAL UPPER RESPIRATORY TRACT INFECTION: Status: RESOLVED | Noted: 2018-01-01 | Resolved: 2018-01-01

## 2018-01-25 PROBLEM — R63.8 POOR FLUID INTAKE: Status: RESOLVED | Noted: 2018-01-01 | Resolved: 2018-01-01

## 2018-01-25 PROBLEM — E87.6 HYPOKALEMIA: Status: ACTIVE | Noted: 2018-01-01

## 2018-01-25 PROBLEM — Z79.01 CHRONIC ANTICOAGULATION: Status: RESOLVED | Noted: 2017-01-01 | Resolved: 2018-01-01

## 2018-01-25 PROBLEM — F02.80 ALZHEIMER'S DEMENTIA WITHOUT BEHAVIORAL DISTURBANCE, UNSPECIFIED TIMING OF DEMENTIA ONSET: Status: ACTIVE | Noted: 2017-01-01

## 2018-01-25 NOTE — CONSULTS
Palliative Care Inpatient Consult  Admission Date: 1/25/2018   Visit Date: January 25, 2018  Location:  Emergency Department  PCP: Regan Loyola   Requested by: Jose Rosas MD, ED    HPI:  Francisco Mason is a 89 year old year old  female, a resident of [facility] sent to the ED for multiple complaints including head pain, cough not responding to Tamiflu, dyspnea.  While in the ED, a CT was done which showed a L throat mass large enough so as to cause rightward deviation of the trachea.  Palliative care was consulted.     Patient Active Problem List    Diagnosis Date Noted     Throat mass 01/25/2018     Priority: Medium     Hypokalemia 01/25/2018     Priority: Medium     Myalgia 01/23/2018     Priority: Medium     Abdominal pain, Right sided lower>upper, unclear etiology 01/03/2018     Priority: Medium     Nausea, intermittent 01/03/2018     Priority: Medium     Essential hypertension 10/17/2017     Priority: Medium     Alzheimer's dementia without behavioral disturbance, unspecified timing of dementia onset 10/17/2017     Priority: Medium     Senile nuclear sclerosis, unspecified laterality 10/17/2017     Priority: Medium     Swelling of limb, bilateral LE's L>R, unclear etiology 10/03/2017     Priority: Medium     Hypertrophy of breast, Right, with unclear etiology  09/19/2017     Priority: Medium     Frail elderly 01/09/2017     Priority: Medium     Long term (current) use of anticoagulants 04/19/2016     Priority: Medium     Osteoarthritis of knee 06/02/2014     Priority: Medium     Atrial fibrillation (H) 06/02/2014     Priority: Medium     Weakness 06/02/2014     Priority: Medium       Past Medical History:   Diagnosis Date     A-fib (H)      Cataract      Dementia      Falls      Memory loss 6/2/2014     Puncture wound of ear drum, right 1/19/2015     Unspecified essential hypertension      Unsteady gait 6/2/2014     Vision problems      Weakness        Past Surgical History:    Procedure Laterality Date     EYE SURGERY       PHACOEMULSIFICATION WITH STANDARD INTRAOCULAR LENS IMPLANT Right 10/6/2014    Procedure: PHACOEMULSIFICATION WITH STANDARD INTRAOCULAR LENS IMPLANT;  Surgeon: Robel oCrmier MD;  Location: [facility] OR       No family history on file.    Social History     Social History Narrative    2018:  History provided by patient.  She tells me she has lived her entire life in [place].  She was  after 34 years of marriage and 6 children.  One son  in his early 30's of a brain tumor but all other children remain alive and nearby.  She worked as a  and  for an .  She was a light smoker for a few years only, in her youth.  She is not able to attend services any longer but identifies herself as Evangelical.      Beny Richard) [facility]    Palliative Care    [facility] cell:  [phone]        Spiritual History:  As above    Advance Care Planning:  Code Status:  DNR / DNI  Health Care Directive: NO  POLST:  No      Allergies   Allergen Reactions     Iodine      Levaquin [Levofloxacin]      Penicillins      Plasticized Base [Bht-Mo-Polyethylene]      Sulfa Drugs        Current Facility-Administered Medications   Medication Dose Route Frequency     clindamycin  900 mg Intravenous Q8H       No current facility-administered medications on file prior to encounter.   Current Outpatient Prescriptions on File Prior to Encounter:  ipratropium - albuterol 0.5 mg/2.5 mg/3 mL (DUONEB) 0.5-2.5 (3) MG/3ML neb solution Take 1 vial by nebulization 4 times daily    albuterol (2.5 MG/3ML) 0.083% neb solution Take 1 vial by nebulization every 2 hours as needed for shortness of breath / dyspnea or wheezing   guaiFENesin (ROBITUSSIN) 100 MG/5ML SYRP Take 10 mLs by mouth every 4 hours as needed for cough   polyethylene glycol (MIRALAX/GLYCOLAX) Packet Take 17 g by mouth 2 times daily as needed    AmLODIPine Besylate (NORVASC PO) Take 5 mg by mouth daily   brimonidine  (ALPHAGAN) 0.2 % ophthalmic solution Place 1 drop into the right eye 2 times daily   LISINOPRIL PO Take 20 mg by mouth daily    diltiazem (DILT-XR) 120 MG 24 hr ER capsule Take 120 mg by mouth daily   Acetaminophen (TYLENOL PO) Take 1,000 mg by mouth 2 times daily    Ondansetron HCl (ZOFRAN PO) Take 4 mg by mouth every 6 hours as needed for nausea or vomiting   olopatadine (PATANOL) 0.1 % ophthalmic solution Place 1 drop Into the left eye 2 times daily as needed for allergies   Acetaminophen (TYLENOL PO) Take 1,000 mg by mouth every 6 hours as needed for mild pain or fever   warfarin (COUMADIN) 1 MG tablet Take 3.5 mg by mouth As directed per INR        Current Facility-Administered Medications   Medication Dose Route Frequency     HYDROmorphone  0.5 mg Intravenous Q15 Min PRN     morphine  5-10 mg Oral Q2H PRN    Or     morphine HIGH CONCENTRATE  5-10 mg Sublingual Q2H PRN     morphine  1-2 mg Intravenous Q1H PRN     acetaminophen  650 mg Oral Q6H PRN     LORazepam  0.5-1 mg Intravenous Q3H PRN    Or     LORazepam  0.5-1 mg Oral Q3H PRN    Or     LORazepam  0.5-1 mg Sublingual Q3H PRN     haloperidol lactate  0.5-1 mg Intravenous Q1H PRN     OLANZapine zydis  2.5-5 mg Sublingual Q6H PRN     ondansetron  4 mg Oral Q6H PRN    Or     ondansetron  4 mg Intravenous Q6H PRN     prochlorperazine  5 mg Intravenous Q6H PRN    Or     prochlorperazine  5 mg Oral Q6H PRN    Or     prochlorperazine  12.5 mg Rectal Q12H PRN       Review of Systems:  Has some difficulty breathing; can't recall if she uses O2 at [facility] but feels the O2 in the ED is helping relieve her air hunger.  Per nursing, has been given no opioids.  She herself denies pain.  She has a dilated L pupil which she said is the result of a stroke, though there is no mention of CVA in her medical history. 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  She has no appetite and states she can't recall problems with swallowing (h/o dementia so I  "can't attest to the accuracy of any of her claims).  She is thirsty, however.  Thinks she feels constipated.  Nursing noted a smear of stool when helping her to the bathroom.  States her voice has been \"weak\" for a long time.  Doesn't know how long she has had the L neck mass, only that it doesn't hurt inside or outside, at least at this moment.     Performance Assessment:    Palliative Performance Scale, v.2     50%  Extensive disease. Normal or reduced intake; normal LOC or confusion; little ambulation (mainly sit/lie), ADLs w/much assistance, unable to do any work.      Exam:  Patient Vitals for the past 24 hrs:   BP Temp Temp src Pulse Resp SpO2   01/25/18 1400 - - - - - 95 %   01/25/18 1345 - - - - - 100 %   01/25/18 1230 - - - - - (!) 87 %   01/25/18 1215 - - - - - 97 %   01/25/18 1200 - - - - - 94 %   01/25/18 1145 - - - - - (!) 82 %   01/25/18 0930 - - - - - (!) 85 %   01/25/18 0915 - - - - - 92 %   01/25/18 0900 - - - - - (!) 89 %   01/25/18 0845 - - - - - 95 %   01/25/18 0745 162/89 - - - - -   01/25/18 0730 (!) 161/95 - - - - -   01/25/18 0715 159/78 - - - - (!) 83 %   01/25/18 0700 168/82 - - - - 92 %   01/25/18 0647 171/88 97.8  F (36.6  C) Oral 98 18 91 %      Wt Readings from Last 5 Encounters:   01/24/18 147 lb (66.7 kg)   01/23/18 148 lb (67.1 kg)   01/22/18 156 lb (70.8 kg)   01/10/18 161 lb (73 kg)   01/04/18 161 lb (73 kg)   Awake, alert, very pleasant in no acute distress  Normocephalic other than very pronounced mass on L neck at junction with thorax, clearly larger than a cupped hand  Sclera anicteric; L pupil dilated and nonreactive, R pupil is reactive to light  OP dry; unable to assess throat as tongue blocks my view and I don't have a tongue blade  CV irreg irreg, tachy  Lungs coarse sounds both bases, breathing unlabored on O2 per nc  Abd soft, NT, hypoactive bowel sounds   MSK: able to sit up and stand slowly and assist with transfer  Ext: swelling in L lower ankle    ROUTINE ICU LABS " (Last four results)  CMP    Recent Labs  Lab 01/25/18  0705      POTASSIUM 3.0*   CHLORIDE 107   CO2 26   ANIONGAP 9   *   BUN 16   CR 0.66   GFRESTIMATED 82   GFRESTBLACK >90   DARIELA 8.3*     CBC    Recent Labs  Lab 01/25/18  0705   WBC 11.2*   RBC 5.00   HGB 14.6   HCT 43.9   MCV 88   MCH 29.2   MCHC 33.3   RDW 15.1*        CT SCAN OF THE NECK WITH CONTRAST  1/25/2018 11:16 AM      HISTORY: Mass and swelling.      TECHNIQUE: Axial images and coronal reformations. Radiation dose for  this scan was reduced using automated exposure control, adjustment of  the mA and/or kV according to patient size, or iterative  reconstruction technique. 80 mL Isovue-370 IV.      COMPARISON: None.     FINDINGS: There is a large masslike lesion that appears to be centered  in the left thyroid lobe. The thyroid gland is diffusely heterogeneous  and contains dystrophic calcifications superiorly. Contained within  the enlarged left thyroid lobe is a cystic collection with a  fluid/fluid layer measuring approximately 6.7 x 5.6 x 8.0 cm (series 2  image 74 and series 4 image 51). No significant surrounding  inflammatory fat stranding. The margins of this masslike lesion appear  fairly well marginated.     The mass causes marked rightward deviation of the oral pharynx and  trachea and narrowing of the inferior trachea. The left common carotid  artery is splayed along the lateral aspect of this mass. The mass  extends inferiorly into the upper mediastinum and abuts the right  aspect of the trachea and esophagus. Posteriorly, the mass abuts the  anterior aspect of the vertebral bodies although there is no lytic or  sclerotic changes of the vertebral bodies.     There is a cluster of enlarged right level IV lymph nodes measuring  1.0 x 1.3 x 1.4 cm and 0.6 x 0.7 x 0.8 cm (coronal image 61 and 64).  Mild adjacent inflammatory fat stranding is seen surrounding the right  scalene muscles which is nonspecific and presumably  reactive.     The submandibular and parotid glands are unremarkable. No definite  mucosal space masses. Moderate mucosal thickening in the visualized  paranasal sinuses. Visualized intracranial structures are  unremarkable.     Bilateral pleural effusions right greater than left better described  on chest CT. Patchy consolidation in the left upper lobe also better  described on chest CT.     Multilevel degenerative changes in the spine. Mild-to-moderate spinal  canal narrowing at C5-C6 due to posterior disc osteophyte complex.         IMPRESSION:     1. Markedly enlarged heterogeneous mass within the left neck that  appears to be centered in the left thyroid lobe. There is a cystic  component within the masslike expansion that contains a fluid/fluid  layer. This could represent hemorrhage with a hematocrit layer  possibly within a thyroid goiter. Underlying thyroid neoplasm cannot  be excluded. The cystic collection may also represent a cystic thyroid  neoplasm although this would be atypical given the apparent abrupt  appearance per the provided clinical history. The borders of the mass  are fairly well-defined suggesting that this does not represent a  high-grade anaplastic thyroid tumor although a low-grade tumor again  is not excluded. No significant surrounding inflammation to suggest  thyroid abscess.  2. Enlarged cluster of right level IV lymph nodes with mild adjacent  inflammation. These are nonspecific and could be reactive or  neoplastic.  3. The large masslike lesion causes rightward deviation of the  oropharynx and trachea and narrowing of the trachea.  4. Bilateral pleural effusions and left upper lobe opacity better  described on chest CT.        HUNTER NAVARRETE MD       Assessment/Plan:  1. Large left neck mass as described in CT above   >per Care Transitions who spoke with son, pt to be enrolled in hospice care later today   >recommend O2 for comfort   >recommended that meds be crushed and diet be  mechanical soft or soft, liquids as before   >stop Chloroseptic for ST; replace with sterile saline irrigating solution PRN   >high risk for acute dyspnea 2o tracheal occlusion, so low threshhold for use of morphine q1h PRN dyspnea; start at 5mg but be prepared to quadruple dose (or more) for acute occlusion   >consider Decadron to reduce local lymphadenopathy; it does come as a liquid, though even the 4mg tab is quite small.  Would start at 4mg or higher.  Give a concurrent PPI (Prevacid ODT) or H2 blocker for GI protection   >Bisacodyl suppository q3d if not able to tolerate Miralax      Thank you for the opportunity to be of service to this patient and family.      Beny Nath CNP ([place])  Palliative Care  [facility] cell:  [phone]       Face to face:   1305 -1400, 55 min > 50% spent discussing patient's current symptoms, and coordinating care with  nursing.   Non face to face:  1400 - 1500, 60 min.

## 2018-01-25 NOTE — DISCHARGE INSTRUCTIONS
From Beny Nath, Palliative Care NP, Cell [phone]      I ordered morphine concentrate which can be given every hour as needed for breathlessness or labored breathing.    Please be aware that the mass in her R neck IS COMPRESSING her trachea, so it's essential that we be alert for any signs of labored breathing.  If present, call hospice and get orders to increase the morphine dose ASAP    At present, she denies pain.    Suggest that Chloraseptic be stopped for sore throat.  Instead, if needed, give her SALINE IRRIGATING SOLUTION (like the solution used to irriate thompson catheters) and use that ASAP.    Suggest she be given a mechanical soft to soft diet.  States she isn't hungry.  But she is thirsty.  Liquids OK--thicken if such has been needed at baseline.     You may call me 24/7 at the number above if advice needed before she gets enrolled in hospice.    I also suggest that consideration be given to crushing meds.    Thank you! beny

## 2018-01-25 NOTE — ED NOTES
This writer attempted 3 times to contact [facility] to give a pt report for return.  No answers by phone.

## 2018-01-25 NOTE — ED PROVIDER NOTES
History     Chief Complaint   Patient presents with     Cough     Headache     Fall     unknown time, is on warfarin.      HPI  Francisco Mason is a 89 year old female with a history of dementia who presents for several complaints.  History is obtained from the patient, EMS, and from the nursing home who we called on phone.  The patient is complaining of posterior head pain, denies any known fall.  She does not think she is taking anything for this and is unsure how long this is been going on.  She says pain is severe and radiating down her neck to her left shoulder.  EMS was concerned for a fall, they state that the patient is on warfarin presumably for atrial fibrillation.  However with discussing with the nursing home, there is no reported fall that they witnessed or at that occurred.  They report that the patient was complaining of chest pressure earlier this morning with shortness of breath and cough.  They tried to give a nebulizer at the nursing home but the patient refused. Review of her records show she had a negative influenza swab yesterday and was given a liter of fluids, had been on Tamiflu prior but this was discontinued.  The patient denies any chest pain, cough, belly pain, nausea, diarrhea, or rash.    Problem List:    Patient Active Problem List    Diagnosis Date Noted     Throat mass 01/25/2018     Priority: Medium     Hypokalemia 01/25/2018     Priority: Medium     Myalgia 01/23/2018     Priority: Medium     Abdominal pain, Right sided lower>upper, unclear etiology 01/03/2018     Priority: Medium     Nausea, intermittent 01/03/2018     Priority: Medium     Essential hypertension 10/17/2017     Priority: Medium     Alzheimer's dementia without behavioral disturbance, unspecified timing of dementia onset 10/17/2017     Priority: Medium     Senile nuclear sclerosis, unspecified laterality 10/17/2017     Priority: Medium     Swelling of limb, bilateral LE's L>R, unclear etiology 10/03/2017      Priority: Medium     Hypertrophy of breast, Right, with unclear etiology  09/19/2017     Priority: Medium     Frail elderly 01/09/2017     Priority: Medium     Long term (current) use of anticoagulants 04/19/2016     Priority: Medium     Osteoarthritis of knee 06/02/2014     Priority: Medium     Atrial fibrillation (H) 06/02/2014     Priority: Medium     Weakness 06/02/2014     Priority: Medium        Past Medical History:    Past Medical History:   Diagnosis Date     A-fib (H)      Cataract      Dementia      Falls      Memory loss 6/2/2014     Puncture wound of ear drum, right 1/19/2015     Unspecified essential hypertension      Unsteady gait 6/2/2014     Vision problems      Weakness        Past Surgical History:    Past Surgical History:   Procedure Laterality Date     EYE SURGERY       PHACOEMULSIFICATION WITH STANDARD INTRAOCULAR LENS IMPLANT Right 10/6/2014    Procedure: PHACOEMULSIFICATION WITH STANDARD INTRAOCULAR LENS IMPLANT;  Surgeon: Robel Cormier MD;  Location: [facility] OR       Family History:    No family history on file.    Social History:  Marital Status:   [5]  Social History   Substance Use Topics     Smoking status: Never Smoker     Smokeless tobacco: Never Used     Alcohol use No        Medications:      Azithromycin (ZITHROMAX Z-PORSCHE PO)   ipratropium - albuterol 0.5 mg/2.5 mg/3 mL (DUONEB) 0.5-2.5 (3) MG/3ML neb solution   albuterol (2.5 MG/3ML) 0.083% neb solution   guaiFENesin (ROBITUSSIN) 100 MG/5ML SYRP   polyethylene glycol (MIRALAX/GLYCOLAX) Packet   AmLODIPine Besylate (NORVASC PO)   brimonidine (ALPHAGAN) 0.2 % ophthalmic solution   LISINOPRIL PO   VITAMIN D, CHOLECALCIFEROL, PO   senna-docusate (SENOKOT-S;PERICOLACE) 8.6-50 MG per tablet   diltiazem (DILT-XR) 120 MG 24 hr ER capsule   Acetaminophen (TYLENOL PO)   Ondansetron HCl (ZOFRAN PO)   olopatadine (PATANOL) 0.1 % ophthalmic solution   Acetaminophen (TYLENOL PO)   warfarin (COUMADIN) 1 MG tablet   phenol (CHLORASEPTIC)  1.4 % LIQD         Review of Systems  Pertinent positives and negatives listed in the HPI, all other systems reviewed and are negative.    Physical Exam   BP: 171/88  Pulse: 98  Temp: 97.8  F (36.6  C)  Resp: 18  SpO2: 91 %      Physical Exam   Constitutional: She appears distressed.   89 year old female, appears stated age   HENT:   Head: Normocephalic and atraumatic.   Right Ear: External ear normal.   Left Ear: External ear normal.   Nose: Nose normal.   Eyes: Conjunctivae are normal. No scleral icterus.   Neck: Normal range of motion.   Cardiovascular: Normal rate and regular rhythm.    No murmur heard.  Pulmonary/Chest: Effort normal. No stridor. No respiratory distress.   Abdominal: Soft. She exhibits no distension. There is no tenderness.   Neurological: She is alert.   Skin: Skin is warm and dry. She is not diaphoretic.   Psychiatric: She has a normal mood and affect. Her behavior is normal.   Nursing note and vitals reviewed.      ED Course     ED Course     Procedures               EKG Interpretation:      Interpreted by Jose Rosas  Time reviewed: 0705  Symptoms at time of EKG: SOB, CP   Rhythm: atrial fibrillation - controlled  Rate: 103  Axis: Normal  Ectopy: none  Conduction: left bundle branch block (complete)  ST Segments/ T Waves: No acute ischemic changes  Comparison to prior: No old EKG available    Clinical Impression: LBBB, atrial fibrillation    Critical Care time:  none               Labs Ordered and Resulted from Time of ED Arrival Up to the Time of Departure from the ED   TROPONIN I - Abnormal; Notable for the following:        Result Value    Troponin I ES 0.235 (*)     All other components within normal limits   BASIC METABOLIC PANEL - Abnormal; Notable for the following:     Potassium 3.0 (*)     Glucose 125 (*)     Calcium 8.3 (*)     All other components within normal limits   CBC WITH PLATELETS DIFFERENTIAL - Abnormal; Notable for the following:     WBC 11.2 (*)     RDW 15.1  (*)     Absolute Neutrophil 9.5 (*)     Absolute Lymphocytes 0.7 (*)     All other components within normal limits   INR - Abnormal; Notable for the following:     INR 2.97 (*)     All other components within normal limits   D DIMER QUANTITATIVE - Abnormal; Notable for the following:     D Dimer 3.2 (*)     All other components within normal limits   TROPONIN I - Abnormal; Notable for the following:     Troponin I ES 0.228 (*)     All other components within normal limits   PERIPHERAL IV CATHETER   COMFORT CARE   PAIN ASSESSMENT   DRESSING   COMFORT CARE   TURN   VITAL SIGNS   INFLUENZA A/B ANTIGEN       Assessments & Plan (with Medical Decision Making)   89-year-old female who presents for headache as well as prior complaints of chest pain and cough.  Temperature is 36.6 C, blood pressure 171/88, heart rate 98, SPO2 91% on room air.  He attempted to take the patient off oxygen and her oxygen saturations did drop here down into the high 80% range.  She is placed back on oxygen with improvement.  White blood cell count is 11.2, mildly elevated.  Hemoglobin 14.6.  INR is therapeutic.  Troponin is 0.235, repeat troponin is stable.  EKG is without ischemic changes.  I think that this likely represents chronic illness with an elevated troponin, unlikely ACS.  Influenza swab was negative.  Chest x-ray obtained, images reviewed independently as well as radiology read reviewed, positive for right-sided pleural effusion, no infiltrate.  With her hypoxia and pain and shortness of breath, d-dimer was elevated at 3.2, therefore CT of the chest was obtained, images reviewed independently as well as radiology read reviewed, no signs of pulmonary embolism but there is a large mass within the neck that is pushing the trachea to the side.  Further imaging involving her head and neck obtained and reviewed, I did speak on the phone with the reading radiologist.  The neck mass was concerning for possible abscess versus hematoma versus  thyroid cancer.  Minimal surrounding edema.  She does have significant displacement of the airway.  I have discussed this with the patient's family.  We had a long conversation about her goals of care.  Currently the patient is DNR/DNI and they would not like to change that.  Initially they wanted to have the patient admitted to help arrange comfort cares for her.  I discussed the case with the on-call physician Dr. Will.  She evaluated the patient and arranged for the patient to be discharged back to her nursing home with hospice care to meet her later today.  She spoke with the family about this and they were in agreement with this plan.  The patient is therefore discharged back to her nursing and will have hospitalist medicine come and evaluate her and help provide traction for care today.    I have reviewed the nursing notes.    I have reviewed the findings, diagnosis, plan and need for follow up with the patient.       New Prescriptions    No medications on file       Final diagnoses:   Airway obstruction       1/25/2018   [facility] EMERGENCY DEPARTMENT     Jose Rosas MD  01/25/18 [id number]

## 2018-01-25 NOTE — ED AVS SNAPSHOT
[facility] Emergency Department    [address] [facility] [address]    Phone:  [phone]    Fax:  [phone]                                       Ms. Francisco Mason   MRN: [id number]    Department:  [facility] Emergency Department   Date of Visit:  1/25/2018           Patient Information     Date Of Birth          6/18/1920        Your diagnoses for this visit were:     Airway obstruction     Throat mass        You were seen by Jose Rosas MD.        Discharge Instructions       From Ryan Nath, Palliative Care NP, Cell [phone]      I ordered morphine concentrate which can be given every hour as needed for breathlessness or labored breathing.    Please be aware that the mass in her R neck IS COMPRESSING her trachea, so it's essential that we be alert for any signs of labored breathing.  If present, call hospice and get orders to increase the morphine dose ASAP    At present, she denies pain.    Suggest that Chloraseptic be stopped for sore throat.  Instead, if needed, give her SALINE IRRIGATING SOLUTION (like the solution used to irriate thompson catheters) and use that ASAP.    Suggest she be given a mechanical soft to soft diet.  States she isn't hungry.  But she is thirsty.  Liquids OK--thicken if such has been needed at baseline.     You may call me 24/7 at the number above if advice needed before she gets enrolled in hospice.    I also suggest that consideration be given to crushing meds.    Thank you! ryan    24 Hour Appointment Hotline       To make an appointment at any [facility] (formerly [facility]), call [phone] ([phone]). If you don't have a family doctor or clinic, we will help you find one. [facility] are conveniently located to serve the needs of you and your family.          ED Discharge Orders     Home Care Referral       ______________________________________________________________    Your provider has referred you to: [facility] (Phone:  [phone] Fax: [phone])    Extended Emergency Contact Information  Primary Emergency Contact: Ej Mason  Address: [address]           [place] [address] [place]  Home Phone: [phone]  Mobile Phone: [phone]  Relation: Son  Secondary Emergency Contact: Paula Skinner  Address: [address] [place] [address] [place]  Mobile Phone: [phone]  Relation: Daughter    Patient Anticipated Discharge Date: 1/25/18   RN, PT, HHA to begin 24 - 48 hours after discharge.  PLEASE EVALUATE AND TREAT (Evaluation timeline is 24 - 48 hrs. Please call if there is need for a variance to this timeline).    REASON FOR REFERRAL: Hospice - Diagnosis: Throat Mass    ADDITIONAL SERVICES NEEDED: None    OTHER PERTINENT INFORMATION: Patient was last seen by provider on 1/25/18 for throat mass.    Current Outpatient Prescriptions:  Azithromycin (ZITHROMAX Z-PORSCHE PO), Take by mouth daily Take 500 mg on day one, then take 250 mg daily for four days, Disp: , Rfl:   ipratropium - albuterol 0.5 mg/2.5 mg/3 mL (DUONEB) 0.5-2.5 (3) MG/3ML neb solution, Take 1 vial by nebulization 4 times daily , Disp: , Rfl:   albuterol (2.5 MG/3ML) 0.083% neb solution, Take 1 vial by nebulization every 2 hours as needed for shortness of breath / dyspnea or wheezing, Disp: , Rfl:   guaiFENesin (ROBITUSSIN) 100 MG/5ML SYRP, Take 10 mLs by mouth every 4 hours as needed for cough, Disp: , Rfl:   polyethylene glycol (MIRALAX/GLYCOLAX) Packet, Take 17 g by mouth 2 times daily as needed , Disp: , Rfl:   AmLODIPine Besylate (NORVASC PO), Take 5 mg by mouth daily, Disp: , Rfl:   brimonidine (ALPHAGAN) 0.2 % ophthalmic solution, Place 1 drop into the right eye 2 times daily, Disp: 1 Bottle, Rfl: 11  LISINOPRIL PO, Take 20 mg by mouth daily , Disp: , Rfl:   VITAMIN D, CHOLECALCIFEROL, PO, Take 1,000 Units by mouth daily, Disp: , Rfl:   senna-docusate (SENOKOT-S;PERICOLACE) 8.6-50 MG per tablet, Take 3 tablets by  mouth At Bedtime , Disp: , Rfl:   diltiazem (DILT-XR) 120 MG 24 hr ER capsule, Take 120 mg by mouth daily, Disp: , Rfl:   Acetaminophen (TYLENOL PO), Take 1,000 mg by mouth 2 times daily , Disp: , Rfl:   Ondansetron HCl (ZOFRAN PO), Take 4 mg by mouth every 6 hours as needed for nausea or vomiting, Disp: , Rfl:   olopatadine (PATANOL) 0.1 % ophthalmic solution, Place 1 drop Into the left eye 2 times daily as needed for allergies, Disp: , Rfl:   Acetaminophen (TYLENOL PO), Take 1,000 mg by mouth every 6 hours as needed for mild pain or fever, Disp: , Rfl:   warfarin (COUMADIN) 1 MG tablet, Take 3.5 mg by mouth As directed per INR , Disp: , Rfl:   phenol (CHLORASEPTIC) 1.4 % LIQD, Take 2 sprays by mouth every 3 hours as needed for sore throat, Disp: , Rfl:       Patient Active Problem List:     Osteoarthritis of knee     Atrial fibrillation (H)     Weakness     Long term (current) use of anticoagulants     Frail elderly     Hypertrophy of breast, Right, with unclear etiology      Swelling of limb, bilateral LE's L>R, unclear etiology     Essential hypertension     Alzheimer's dementia without behavioral disturbance, unspecified timing of dementia onset     Senile nuclear sclerosis, unspecified laterality     Abdominal pain, Right sided lower>upper, unclear etiology     Nausea, intermittent     Myalgia     Throat mass     Hypokalemia      Documentation of Face to Face and Certification for Home Health Services    I certify that patient, Francisco Mason is under my care and that I, or a Nurse Practitioner or Physician's Assistant working with me, had a face-to-face encounter that meets the physician face-to-face encounter requirements with this patient on: 1/25/2018.    This encounter with the patient was in whole, or in part, for the following medical condition, which is the primary reason for Home Health Care: Hospice.    I certify that, based on my findings, the following services are medically necessary Home Health  Services: Nursing    My clinical findings support the need for the above services because: Nurse is needed: To provide hospice.    Further, I certify that my clinical findings support that this patient is homebound (i.e. absences from home require considerable and taxing effort and are for medical reasons or [facility] services or infrequently or of short duration when for other reasons) because: Requires assistance of another person or specialized equipment to access medical services because patient: Requires supervision of another for safe transfer..    Based on the above findings, I certify that this patient is confined to the home and needs intermittent skilled nursing care, physical therapy and/or speech therapy.  The patient is under my care, and I have initiated the establishment of the plan of care.  This patient will be followed by a physician who will periodically review the plan of care.    Physician/Provider to provide follow up care: Regan Loyola certified Physician at time of discharge: Dr. Rosas    Please be aware that coverage of these services is subject to the terms and limitations of your health insurance plan.  Call member services at your health plan with any benefit or coverage questions.                     Review of your medicines      START taking        Dose / Directions Last dose taken    morphine sulfate HIGH CONCENTRATE 20 mg/mL (HIGH CONC) solution   Commonly known as:  ROXANOL *CONCENTRATED*   Dose:  5 mg   Quantity:  30 mL        Place 0.25 mLs (5 mg) under the tongue every hour as needed for moderate to severe pain or other (or dyspnea or RR > 16)   Refills:  0          Our records show that you are taking the medicines listed below. If these are incorrect, please call your family doctor or clinic.        Dose / Directions Last dose taken    albuterol (2.5 MG/3ML) 0.083% neb solution   Dose:  1 vial        Take 1 vial by nebulization every 2 hours as needed  for shortness of breath / dyspnea or wheezing   Refills:  0        brimonidine 0.2 % ophthalmic solution   Commonly known as:  ALPHAGAN   Dose:  1 drop   Quantity:  1 Bottle        Place 1 drop into the right eye 2 times daily   Refills:  11        COUMADIN 1 MG tablet   Dose:  3.5 mg   Generic drug:  warfarin        Take 3.5 mg by mouth As directed per INR   Refills:  0        DILT- MG 24 hr capsule   Dose:  120 mg   Generic drug:  diltiazem        Take 120 mg by mouth daily   Refills:  0        guaiFENesin 100 MG/5ML Syrp   Commonly known as:  ROBITUSSIN   Dose:  10 mL        Take 10 mLs by mouth every 4 hours as needed for cough   Refills:  0        ipratropium - albuterol 0.5 mg/2.5 mg/3 mL 0.5-2.5 (3) MG/3ML neb solution   Commonly known as:  DUONEB   Dose:  1 vial        Take 1 vial by nebulization 4 times daily   Refills:  0        LISINOPRIL PO   Dose:  20 mg        Take 20 mg by mouth daily   Refills:  0        NORVASC PO   Dose:  5 mg        Take 5 mg by mouth daily   Refills:  0        olopatadine 0.1 % ophthalmic solution   Commonly known as:  PATANOL   Dose:  1 drop        Place 1 drop Into the left eye 2 times daily as needed for allergies   Refills:  0        polyethylene glycol Packet   Commonly known as:  MIRALAX/GLYCOLAX   Dose:  17 g        Take 17 g by mouth 2 times daily as needed   Refills:  0        * TYLENOL PO   Dose:  1000 mg        Take 1,000 mg by mouth every 6 hours as needed for mild pain or fever   Refills:  0        * TYLENOL PO   Dose:  1000 mg        Take 1,000 mg by mouth 2 times daily   Refills:  0        ZITHROMAX Z-PORSCHE PO        Take by mouth daily Take 500 mg on day one, then take 250 mg daily for four days   Refills:  0        ZOFRAN PO   Dose:  4 mg        Take 4 mg by mouth every 6 hours as needed for nausea or vomiting   Refills:  0        * Notice:  This list has 2 medication(s) that are the same as other medications prescribed for you. Read the directions carefully,  and ask your doctor or other care provider to review them with you.      STOP taking        Dose Reason for stopping Comments    CHLORASEPTIC 1.4 % Liqd spray   Generic drug:  phenol              senna-docusate 8.6-50 MG per tablet   Commonly known as:  SENOKOT-S;PERICOLACE              VITAMIN D (CHOLECALCIFEROL) PO                      Prescriptions were sent or printed at these locations (1 Prescription)                   Other Prescriptions                Printed at Department/Unit printer (1 of 1)         morphine sulfate HIGH CONCENTRATE (ROXANOL *CONCENTRATED*) 20 mg/mL (HIGH CONC) solution                Procedures and tests performed during your visit     Basic metabolic panel    CBC with platelets differential    CT Chest Pulmonary Embolism w Contrast    CT Head w/o Contrast    Care Transition RN/SW IP Consult    D dimer quantitative    ED Bed Request    EKG 12 lead    EKG 12-lead, tracing only    INR    Influenza A/B antigen    Soft tissue neck CT w contrast    Troponin I    Troponin I (second draw)    XR Chest 2 Views      Orders Needing Specimen Collection     None      Pending Results     Date and Time Order Name Status Description    1/25/2018 0653 XR Chest 2 Views Preliminary             Pending Culture Results     No orders found from 1/23/2018 to 1/26/2018.            Pending Results Instructions     If you had any lab results that were not finalized at the time of your Discharge, you can call the ED Lab Result RN at [phone]. You will be contacted by this team for any positive Lab results or changes in treatment. The nurses are available 7 days a week from 10A to 6:30P.  You can leave a message 24 hours per day and they will return your call.        Test Results From Your Hospital Stay        1/25/2018  8:06 AM      Component Results     Component Value Ref Range & Units Status    Troponin I ES 0.235 (HH) 0.000 - 0.045 ug/L Final    The 99th percentile for upper reference range is 0.045 ug/L.   Troponin values   in the range of 0.045 - 0.120 ug/L may be associated with risks of adverse   clinical events.  Critical Value called to and read back by  JADYN PAINTER RN. 1/25/18 AT 0805 AS           1/25/2018  8:01 AM      Component Results     Component Value Ref Range & Units Status    Sodium 142 133 - 144 mmol/L Final    Potassium 3.0 (L) 3.4 - 5.3 mmol/L Final    Chloride 107 94 - 109 mmol/L Final    Carbon Dioxide 26 20 - 32 mmol/L Final    Anion Gap 9 3 - 14 mmol/L Final    Glucose 125 (H) 70 - 99 mg/dL Final    Urea Nitrogen 16 7 - 30 mg/dL Final    Creatinine 0.66 0.52 - 1.04 mg/dL Final    GFR Estimate 82 >60 mL/min/1.7m2 Final    Non  GFR Calc    GFR Estimate If Black >90 >60 mL/min/1.7m2 Final    African American GFR Calc    Calcium 8.3 (L) 8.5 - 10.1 mg/dL Final         1/25/2018  7:42 AM      Component Results     Component Value Ref Range & Units Status    WBC 11.2 (H) 4.0 - 11.0 10e9/L Final    RBC Count 5.00 3.8 - 5.2 10e12/L Final    Hemoglobin 14.6 11.7 - 15.7 g/dL Final    Hematocrit 43.9 35.0 - 47.0 % Final    MCV 88 78 - 100 fl Final    MCH 29.2 26.5 - 33.0 pg Final    MCHC 33.3 31.5 - 36.5 g/dL Final    RDW 15.1 (H) 10.0 - 15.0 % Final    Platelet Count 196 150 - 450 10e9/L Final    Diff Method Automated Method  Final    % Neutrophils 84.6 % Final    % Lymphocytes 6.5 % Final    % Monocytes 8.3 % Final    % Eosinophils 0.1 % Final    % Basophils 0.1 % Final    % Immature Granulocytes 0.4 % Final    Absolute Neutrophil 9.5 (H) 1.6 - 8.3 10e9/L Final    Absolute Lymphocytes 0.7 (L) 0.8 - 5.3 10e9/L Final    Absolute Monocytes 0.9 0.0 - 1.3 10e9/L Final    Absolute Eosinophils 0.0 0.0 - 0.7 10e9/L Final    Absolute Basophils 0.0 0.0 - 0.2 10e9/L Final    Abs Immature Granulocytes 0.1 0 - 0.4 10e9/L Final         1/25/2018  8:34 AM      Narrative     CHEST TWO VIEWS  1/25/2018 8:24 AM    HISTORY:  Cough. Shortness of breath. Chest pain.    COMPARISON:  None.         Impression     IMPRESSION:  The patient is rotated. Moderate right pleural effusion.  Lungs are clear. Heart and pulmonary vasculature within normal limits.  Large masslike opacity projects over the upper mediastinum, deviating  the trachea to the right. It measures approximately 8 x 7 cm in  diameter. Differential diagnosis includes enlarged thyroid gland but  is indeterminate. CT evaluation recommended.         1/25/2018  7:38 AM      Component Results     Component Value Ref Range & Units Status    INR 2.97 (H) 0.86 - 1.14 Final         1/25/2018  7:18 AM      Component Results     Component Value Ref Range & Units Status    Influenza A/B Agn Specimen Nasopharyngeal  Final    Influenza A Negative NEG^Negative Final    Influenza B Negative NEG^Negative Final    Test results must be correlated with clinical data. If necessary, results   should be confirmed by a molecular assay or viral culture.           1/25/2018  8:31 AM      Narrative     CT SCAN OF THE HEAD WITHOUT CONTRAST   1/25/2018 8:15 AM     HISTORY: Headache.     TECHNIQUE:  Axial images of the head and coronal reformations without  IV contrast material. Radiation dose for this scan was reduced using  automated exposure control, adjustment of the mA and/or kV according  to patient size, or iterative reconstruction technique.    COMPARISON: None.    FINDINGS: There is no evidence of intracranial hemorrhage, mass, acute  infarct or anomaly. There is generalized atrophy of the brain. There  is low attenuation in the white matter of the cerebral hemispheres  consistent with sequelae of small vessel ischemic disease. Area of  hypodensity within the paramedian right occipital lobe.    Moderate mucosal thickening within the visualized paranasal sinuses.  The bony calvarium and bones of the skull base appear intact.         Impression     IMPRESSION:     1. No evidence of acute intracranial hemorrhage, mass, or herniation.  2. There is generalized atrophy of  the brain. White matter changes are  present in the cerebral hemispheres that are consistent with small  vessel ischemic disease in this age patient.   3. Area of hypodensity in the paramedian right occipital lobe  suggestive of encephalomalacia from previous insult, possibly an  infarct.    HUNTER NAVARRETE MD         1/25/2018  9:03 AM      Component Results     Component Value Ref Range & Units Status    D Dimer 3.2 (H) 0.0 - 0.50 ug/ml FEU Final    This D-dimer assay is intended for use in conjunction with a clinical pretest   probability assessment model to exclude pulmonary embolism (PE) and deep   venous thrombosis (DVT) in outpatients suspected of PE or DVT. The cut-off   value is 0.5 ug/mL FEU.           1/25/2018 11:44 AM      Narrative     CT CHEST PULMONARY EMBOLISM WITH CONTRAST  1/25/2018 10:10 AM    HISTORY:  Shortness of breath. Hypoxic, chest pain, right pleural  effusion.     TECHNIQUE: Scans obtained from the apices through the diaphragm with  IV contrast. 68 mL Isovue 370 injected. Radiation dose for this scan  was reduced using automated exposure control, adjustment of the mA  and/or kV according to patient size, or iterative reconstruction  technique.    COMPARISON:  None.    FINDINGS:  There is a large mass that appears heterogeneous on the  included images of the inferior neck that causes severe rightward  tracheal deviation and esophageal deviation. It extends inferiorly  into the upper mediastinum to lie between the left and right common  carotid arteries. In transverse plane this mass is approximately 7.8 x  8.5 cm, series 4 image 7. It has internal fluid density material.    No acute thoracic aortic abnormality is seen. Thoracic aorta shows  calcifications. A few ill-defined coronary calcifications also noted,  but assessment is limited. No evidence for pulmonary embolism.    Moderate right pleural effusion. Small left pleural effusion.  Associated prominent atelectasis at the bilateral  lower lobes and  right middle lobe. There are patchy areas of airspace disease  identified. Small focus of consolidation at the posterior left upper  lobe is 1.8 cm, series 5 image 33. There are other small areas of  patchy groundglass airspace disease bilaterally, for example at the  right upper lobe, and bilateral lower lobes. Any underlying airspace  disease within the severely atelectatic lungs cannot be excluded.    Cardiomegaly. A few mildly prominent lymph nodes at the mediastinum  adjacent to the aforementioned mass. An example at the left  paratracheal location is approximately 1.1 x 1.1 cm, series 4 image  36. There are other nearby examples.    Upper abdomen images show cholelithiasis, hepatic and renal cysts.        Impression     IMPRESSION:  1. Large heterogeneous mass with some internal fluid content at the  base of the neck along the left paratracheal location. It is at least  8.5 cm. It causes severe mass effect and rightward tracheal and  esophageal deviation. It is at the expected location of the thyroid.  The differential includes large malignancy, large thyroid goiter with  internal hematoma, and potentially abscess and phlegmon given the  fluid component. Recommend close clinical followup and further workup.  2. No pulmonary embolism or acute thoracic aortic abnormality.  3. Moderate right pleural effusion. Small left pleural effusion.  Prominent associated atelectasis.  4. Patchy areas of airspace disease bilaterally. Cannot exclude  multifocal pneumonia. One of the areas of nodularity is prominent at  the left upper lobe. Recommend three-month followup CT chest.  5. Cardiomegaly.  6. A few mildly prominent thoracic lymph nodes.  7. Cholelithiasis.    SAVANNA SANTOS MD         1/25/2018 12:12 PM      Narrative     CT SCAN OF THE NECK WITH CONTRAST  1/25/2018 11:16 AM     HISTORY: Mass and swelling.     TECHNIQUE: Axial images and coronal reformations. Radiation dose for  this scan was reduced  using automated exposure control, adjustment of  the mA and/or kV according to patient size, or iterative  reconstruction technique. 80 mL Isovue-370 IV.     COMPARISON: None.    FINDINGS: There is a large masslike lesion that appears to be centered  in the left thyroid lobe. The thyroid gland is diffusely heterogeneous  and contains dystrophic calcifications superiorly. Contained within  the enlarged left thyroid lobe is a cystic collection with a  fluid/fluid layer measuring approximately 6.7 x 5.6 x 8.0 cm (series 2  image 74 and series 4 image 51). No significant surrounding  inflammatory fat stranding. The margins of this masslike lesion appear  fairly well marginated.    The mass causes marked rightward deviation of the oral pharynx and  trachea and narrowing of the inferior trachea. The left common carotid  artery is splayed along the lateral aspect of this mass. The mass  extends inferiorly into the upper mediastinum and abuts the right  aspect of the trachea and esophagus. Posteriorly, the mass abuts the  anterior aspect of the vertebral bodies although there is no lytic or  sclerotic changes of the vertebral bodies.    There is a cluster of enlarged right level IV lymph nodes measuring  1.0 x 1.3 x 1.4 cm and 0.6 x 0.7 x 0.8 cm (coronal image 61 and 64).  Mild adjacent inflammatory fat stranding is seen surrounding the right  scalene muscles which is nonspecific and presumably reactive.    The submandibular and parotid glands are unremarkable. No definite  mucosal space masses. Moderate mucosal thickening in the visualized  paranasal sinuses. Visualized intracranial structures are  unremarkable.    Bilateral pleural effusions right greater than left better described  on chest CT. Patchy consolidation in the left upper lobe also better  described on chest CT.    Multilevel degenerative changes in the spine. Mild-to-moderate spinal  canal narrowing at C5-C6 due to posterior disc osteophyte complex.         Impression     IMPRESSION:     1. Markedly enlarged heterogeneous mass within the left neck that  appears to be centered in the left thyroid lobe. There is a cystic  component within the masslike expansion that contains a fluid/fluid  layer. This could represent hemorrhage with a hematocrit layer  possibly within a thyroid goiter. Underlying thyroid neoplasm cannot  be excluded. The cystic collection may also represent a cystic thyroid  neoplasm although this would be atypical given the apparent abrupt  appearance per the provided clinical history. The borders of the mass  are fairly well-defined suggesting that this does not represent a  high-grade anaplastic thyroid tumor although a low-grade tumor again  is not excluded. No significant surrounding inflammation to suggest  thyroid abscess.  2. Enlarged cluster of right level IV lymph nodes with mild adjacent  inflammation. These are nonspecific and could be reactive or  neoplastic.  3. The large masslike lesion causes rightward deviation of the  oropharynx and trachea and narrowing of the trachea.  4. Bilateral pleural effusions and left upper lobe opacity better  described on chest CT.      HUNTER NAVARRETE MD         1/25/2018 12:07 PM      Component Results     Component Value Ref Range & Units Status    Troponin I ES 0.228 (HH) 0.000 - 0.045 ug/L Final    The 99th percentile for upper reference range is 0.045 ug/L.  Troponin values   in the range of 0.045 - 0.120 ug/L may be associated with risks of adverse   clinical events.  Consistent with previous critical result                  Thank you for choosing [facility]       Thank you for choosing [facility] for your care. Our goal is always to provide you with excellent care. Hearing back from our patients is one way we can continue to improve our services. Please take a few minutes to complete the written survey that you may receive in the mail after you visit with us. Thank you!        MyChart Information      "[employer] lets you send messages to your doctor, view your test results, renew your prescriptions, schedule appointments and more. To sign up, go to [email or web address] . Click on \"Log in\" on the left side of the screen, which will take you to the Welcome page. Then click on \"Sign up Now\" on the right side of the page.     You will be asked to enter the access code listed below, as well as some personal information. Please follow the directions to create your username and password.     Your access code is: [email or web address]  Expires: 2018  1:45 PM     Your access code will  in 90 days. If you need help or a new code, please call your [place] clinic or [phone].        Care EveryWhere ID     This is your Care EveryWhere ID. This could be used by other organizations to access your [place] medical records  [id number]        Equal Access to Services     IMELDA MICHAUD AH: Hadii jose sainzo Souriel, waaxda lugabo, qaybta kaalmada adeaníbal, gera brooks . So [facility] [phone].    ATENCIÓN: Si habla español, tiene a roach disposición servicios gratuitos de asistencia lingüística. Llame al [phone].    We comply with applicable federal civil rights laws and [place] laws. We do not discriminate on the basis of race, color, national origin, age, disability, sex, sexual orientation, or gender identity.            After Visit Summary       This is your record. Keep this with you and show to your community pharmacist(s) and doctor(s) at your next visit.                  "

## 2018-01-25 NOTE — ED NOTES
Pt from [facility]. Pt present with c/o cough, chest discomfort with cough, shoulder pain and SOB with headache. Pt has right arm IV, not placed by EMS or ED staff. Pt with hx of dementia and is unable to give accurate history outside of c/o headache and left arm discomfort with cough.

## 2018-01-25 NOTE — CONSULTS
"[facility]    History and Physical  Hospital Medicine       Date of Admission:  1/25/2018  Date of Service: 1/25/2018     Primary Care Physician   Regan Loyola [phone]    Assessment & Plan   Francisco Mason is a 89 year old female with PMH significant for dementia, Afib, HTN who now presents with progressive difficulty breathing and difficulty swallowing x3 months.      Throat mass  CT chest shows: \"Large heterogeneous mass with some internal fluid content at the base of the neck along the left paratracheal location. It is at least 8.5 cm. It causes severe mass effect and rightward tracheal and esophageal deviation. It is at the expected location of the thyroid. The differential includes large malignancy, large thyroid goiter with internal hematoma, and potentially abscess and phlegmon given the fluid component.\"  Discussed these findings in detail with the patient and her son Terrance (via phone) whom is her POA. Discussed that if this mass were to be infectious, it would likely require intervention beyond antibiotics via IR drainage.  Also discussed that if this mass were to be solid, it would more than likely require extensive surgical intervention.  They both convey that the patient is DNR/DNI and would prefer NO interventions (i.e. IR drainage, antibiotics given futility due to size of the mass, surgery) be completed.  The patient desires to go home with hospice.  - IP care transition consult placed      Hypokalemia  3.0 on arrival  DDx: poor nutrition due to poor appetite.  Likely secondary to above mass.  - no intervention given intent to go home on hospice    Elevated Troponin   0.235 -> 0.228  DDx: strain due to above  - no intervention given intent to go home on hospice    Atypical CT  \"Patchy areas of airspace disease bilaterally. Cannot exclude multifocal pneumonia. One of the areas of nodularity is prominent at the left upper lobe. Recommend three-month follow up CT chest.\" "  No subjective new cough, fever, or chills.   - no intervention given intent to go home on hospice    Atrial fibrillation (H)  - continue PTA warfarin on discharge (to be further discussed with hospice)  - continue PTA diltiazem on discharge (to be further discussed with hospice)      Essential hypertension  - continue PTA lisinopril on discharge (to be further discussed with hospice)    Alzheimer's dementia without behavioral disturbance, unspecified timing of dementia onset  Per son Terrance, very poor short term memory.  Long term memory is intact. Lives in [facility].       Code Status: DNR / DNI    Disposition: Anticipate discharge to [place] from ED.    Case discussed with Dr. Niko Will.  Assessment and plan as written above.    Annette Barbour PA-C  [employer] [facility]    History is obtained from the patient and review of the EMR.    Past Medical History    Past Medical History:   Diagnosis Date     A-fib (H)      Cataract      Dementia      Falls      Memory loss 6/2/2014     Puncture wound of ear drum, right 1/19/2015     Unspecified essential hypertension      Unsteady gait 6/2/2014     Vision problems      Weakness        Past Surgical History   Past Surgical History:   Procedure Laterality Date     EYE SURGERY       PHACOEMULSIFICATION WITH STANDARD INTRAOCULAR LENS IMPLANT Right 10/6/2014    Procedure: PHACOEMULSIFICATION WITH STANDARD INTRAOCULAR LENS IMPLANT;  Surgeon: Robel Cormier MD;  Location: [facility] OR       Family History  - non contributory.    History of Present Illness   Francisco Mason is a 89 year old female with PMH significant for dementia, Afib, HTN who now presents with progressive difficulty breathing and difficulty swallowing x3 months.    The patient is a somewhat poor historian as she has very short term memory.  She states that she was brought to the ED due to progressive shortness of breath.  In addition, per ED notation, it appears that she complained this morning  chest pressure with associated SOB and cough.  In addition, she tells me that her voice has slowly become more muffled over a period of 3 months and that she has noted new dysphagia.  She thinks that she has lost some weight.    Ultimately, it was found that the patient had a large mass at the base of her neck.  These findings were discussed with both the patient and her son Terrance whom is POA.  We discussed that the mass could be solid (i.e malignancy versus a benign mass) versus infectious.  If the mass were to be solid, surgical intervention or perhaps tracheostomy would be neccessary for long term health; both the patient and her son convey that they DO NOT WISH for such an extreme measure to be taken.  In addition, if infectious, the mass would likely require drainage via interventional radiology as it would be VERY unlikely to clear with IV antibiotics alone; again, both the patient and her son convey that they DO NOT WISH for such a puncture to take place.  When discussing the futility of antibiotics with the absence of possible abscess drainage, both the patient and her son convey that they would like antibiotics to be discontinued.    Ultimately, the patient and her son wish for her to discharge to [facility] with hospice.  They convey that should this be completed by this afternoon, they are comfortable with discharge.  This was achieve with the help of CTS and the patient was discharged from the ED.    Prior to Admission Medications   Prior to Admission Medications   Prescriptions Last Dose Informant Patient Reported? Taking?   Acetaminophen (TYLENOL PO) 1/24/2018 at [address] [facility] Yes Yes   Sig: Take 1,000 mg by mouth 2 times daily    Acetaminophen (TYLENOL PO) Unknown at Unknown time [facility] Yes No   Sig: Take 1,000 mg by mouth every 6 hours as needed for mild pain or fever   AmLODIPine Besylate (NORVASC PO) 1/24/2018 at 0800 [facility] Yes Yes   Sig: Take 5 mg by mouth daily    Azithromycin (ZITHROMAX Z-PORSCHE PO) 1/24/2018 at am [facility] Yes Yes   Sig: Take by mouth daily Take 500 mg on day one, then take 250 mg daily for four days   LISINOPRIL PO 1/24/2018 at [facility] Yes Yes   Sig: Take 20 mg by mouth daily    Ondansetron HCl (ZOFRAN PO) Unknown at Unknown time [facility] Yes No   Sig: Take 4 mg by mouth every 6 hours as needed for nausea or vomiting   VITAMIN D, CHOLECALCIFEROL, PO 1/24/2018 at 0800 [facility] Yes Yes   Sig: Take 1,000 Units by mouth daily   albuterol (2.5 MG/3ML) 0.083% neb solution 1/24/2018 at [facility] [facility] Yes Yes   Sig: Take 1 vial by nebulization every 2 hours as needed for shortness of breath / dyspnea or wheezing   brimonidine (ALPHAGAN) 0.2 % ophthalmic solution 1/24/2018 at [address] [facility] No Yes   Sig: Place 1 drop into the right eye 2 times daily   diltiazem (DILT-XR) 120 MG 24 hr ER capsule 1/24/2018 at 0800 [facility] Yes Yes   Sig: Take 120 mg by mouth daily   guaiFENesin (ROBITUSSIN) 100 MG/5ML SYRP 1/24/2018 at [facility] [facility] Yes Yes   Sig: Take 10 mLs by mouth every 4 hours as needed for cough   ipratropium - albuterol 0.5 mg/2.5 mg/3 mL (DUONEB) 0.5-2.5 (3) MG/3ML neb solution 1/24/2018 at [address] [facility] Yes Yes   Sig: Take 1 vial by nebulization 4 times daily    olopatadine (PATANOL) 0.1 % ophthalmic solution Unknown at Unknown time [facility] Yes No   Sig: Place 1 drop Into the left eye 2 times daily as needed for allergies   phenol (CHLORASEPTIC) 1.4 % LIQD Unknown at Unknown time [facility] Yes No   Sig: Take 2 sprays by mouth every 3 hours as needed for sore throat   polyethylene glycol (MIRALAX/GLYCOLAX) Packet 1/24/2018 at [address] [facility] Yes Yes   Sig: Take 17 g by mouth 2 times daily as needed    senna-docusate (SENOKOT-S;PERICOLACE) 8.6-50 MG per tablet 1/24/2018 at [facility] Yes Yes   Sig: Take 3 tablets by mouth At Bedtime    warfarin (COUMADIN) 1 MG tablet 1/22/2018 at [facility] Yes No    Sig: Take 3.5 mg by mouth As directed per INR       Facility-Administered Medications: None       Allergies   Allergies   Allergen Reactions     Iodine      Levaquin [Levofloxacin]      Penicillins      Plasticized Base [Bht-Mo-Polyethylene]      Sulfa Drugs        Social History   Social History     Social History     Marital status:      Spouse name: N/A     Number of children: N/A     Years of education: N/A     Occupational History     Not on file.     Social History Main Topics     Smoking status: Never Smoker     Smokeless tobacco: Never Used     Alcohol use No     Drug use: No     Sexual activity: Not on file     Other Topics Concern     Not on file     Social History Narrative   Lives in [facility].    Physical Exam     /89  Pulse 98  Temp 97.8  F (36.6  C) (Oral)  Resp 18  SpO2 (!) 82%  Breastfeeding? No     Weight: 0 lbs 0 oz There is no height or weight on file to calculate BMI.     Constitutional: Alert and oriented x4.  Cooperative.  Appears younger than stated age.  Slight inspiratory stridor is appreciated, however the patient does not appear to be in overt respiratory distress.   HEENT: Oropharynx is clear and moist. No evidence of cranial trauma. Voice is muffled.  Appreciable mass to the left lateral neck; this is slightly tender to palpation.  Lymph/Hematologic: No occipital, submental, submandibular, anterior or posterior cervical, or supraclavicular lymphadenopathy is appreciated.  Cardiovascular: Seemingly Regular rate/rhythm.  S1 and S2 grossly normal.  No appreciable murmur, rub, gallop.  No lower extremity edema.  Respiratory: Inspiratory rales appreciated; best at both lung bases.  Upper lung fields are seemingly clear to auscultation bilaterally. Equal chest expansion.  GI: Soft, non-tender, normal bowel sounds, no hepatosplenomegaly.  Genitourinary: Deferred  Musculoskeletal: Normal muscle bulk and tone.  Skin: Warm and dry, no rashes.   Neurologic: Neck supple.  Cranial nerves 3-12 are grossly intact.  is symmetric.     Data   Data reviewed today:     Recent Labs  Lab 01/25/18  1050 01/25/18  0705   WBC  --  11.2*   HGB  --  14.6   MCV  --  88   PLT  --  196   INR  --  2.97*   NA  --  142   POTASSIUM  --  3.0*   CHLORIDE  --  107   CO2  --  26   BUN  --  16   CR  --  0.66   ANIONGAP  --  9   DARIELA  --  8.3*   GLC  --  125*   TROPI 0.228* 0.235*       Recent Results (from the past 24 hour(s))   CT Head w/o Contrast    Narrative    CT SCAN OF THE HEAD WITHOUT CONTRAST   1/25/2018 8:15 AM     HISTORY: Headache.     TECHNIQUE:  Axial images of the head and coronal reformations without  IV contrast material. Radiation dose for this scan was reduced using  automated exposure control, adjustment of the mA and/or kV according  to patient size, or iterative reconstruction technique.    COMPARISON: None.    FINDINGS: There is no evidence of intracranial hemorrhage, mass, acute  infarct or anomaly. There is generalized atrophy of the brain. There  is low attenuation in the white matter of the cerebral hemispheres  consistent with sequelae of small vessel ischemic disease. Area of  hypodensity within the paramedian right occipital lobe.    Moderate mucosal thickening within the visualized paranasal sinuses.  The bony calvarium and bones of the skull base appear intact.       Impression    IMPRESSION:     1. No evidence of acute intracranial hemorrhage, mass, or herniation.  2. There is generalized atrophy of the brain. White matter changes are  present in the cerebral hemispheres that are consistent with small  vessel ischemic disease in this age patient.   3. Area of hypodensity in the paramedian right occipital lobe  suggestive of encephalomalacia from previous insult, possibly an  infarct.    HUNTER NAVARRETE MD   XR Chest 2 Views    Narrative    CHEST TWO VIEWS  1/25/2018 8:24 AM    HISTORY:  Cough. Shortness of breath. Chest pain.    COMPARISON:  None.      Impression     IMPRESSION:  The patient is rotated. Moderate right pleural effusion.  Lungs are clear. Heart and pulmonary vasculature within normal limits.  Large masslike opacity projects over the upper mediastinum, deviating  the trachea to the right. It measures approximately 8 x 7 cm in  diameter. Differential diagnosis includes enlarged thyroid gland but  is indeterminate. CT evaluation recommended.   CT Chest Pulmonary Embolism w Contrast    Narrative    CT CHEST PULMONARY EMBOLISM WITH CONTRAST  1/25/2018 10:10 AM    HISTORY:  Shortness of breath. Hypoxic, chest pain, right pleural  effusion.     TECHNIQUE: Scans obtained from the apices through the diaphragm with  IV contrast. 68 mL Isovue 370 injected. Radiation dose for this scan  was reduced using automated exposure control, adjustment of the mA  and/or kV according to patient size, or iterative reconstruction  technique.    COMPARISON:  None.    FINDINGS:  There is a large mass that appears heterogeneous on the  included images of the inferior neck that causes severe rightward  tracheal deviation and esophageal deviation. It extends inferiorly  into the upper mediastinum to lie between the left and right common  carotid arteries. In transverse plane this mass is approximately 7.8 x  8.5 cm, series 4 image 7. It has internal fluid density material.    No acute thoracic aortic abnormality is seen. Thoracic aorta shows  calcifications. A few ill-defined coronary calcifications also noted,  but assessment is limited. No evidence for pulmonary embolism.    Moderate right pleural effusion. Small left pleural effusion.  Associated prominent atelectasis at the bilateral lower lobes and  right middle lobe. There are patchy areas of airspace disease  identified. Small focus of consolidation at the posterior left upper  lobe is 1.8 cm, series 5 image 33. There are other small areas of  patchy groundglass airspace disease bilaterally, for example at the  right upper lobe, and  bilateral lower lobes. Any underlying airspace  disease within the severely atelectatic lungs cannot be excluded.    Cardiomegaly. A few mildly prominent lymph nodes at the mediastinum  adjacent to the aforementioned mass. An example at the left  paratracheal location is approximately 1.1 x 1.1 cm, series 4 image  36. There are other nearby examples.    Upper abdomen images show cholelithiasis, hepatic and renal cysts.      Impression    IMPRESSION:  1. Large heterogeneous mass with some internal fluid content at the  base of the neck along the left paratracheal location. It is at least  8.5 cm. It causes severe mass effect and rightward tracheal and  esophageal deviation. It is at the expected location of the thyroid.  The differential includes large malignancy, large thyroid goiter with  internal hematoma, and potentially abscess and phlegmon given the  fluid component. Recommend close clinical followup and further workup.  2. No pulmonary embolism or acute thoracic aortic abnormality.  3. Moderate right pleural effusion. Small left pleural effusion.  Prominent associated atelectasis.  4. Patchy areas of airspace disease bilaterally. Cannot exclude  multifocal pneumonia. One of the areas of nodularity is prominent at  the left upper lobe. Recommend three-month followup CT chest.  5. Cardiomegaly.  6. A few mildly prominent thoracic lymph nodes.  7. Cholelithiasis.    SAVANNA SANTOS MD   Soft tissue neck CT w contrast    Narrative    CT SCAN OF THE NECK WITH CONTRAST  1/25/2018 11:16 AM     HISTORY: Mass and swelling.     TECHNIQUE: Axial images and coronal reformations. Radiation dose for  this scan was reduced using automated exposure control, adjustment of  the mA and/or kV according to patient size, or iterative  reconstruction technique. 80 mL Isovue-370 IV.     COMPARISON: None.    FINDINGS: There is a large masslike lesion that appears to be centered  in the left thyroid lobe. The thyroid gland is diffusely  heterogeneous  and contains dystrophic calcifications superiorly. Contained within  the enlarged left thyroid lobe is a cystic collection with a  fluid/fluid layer measuring approximately 6.7 x 5.6 x 8.0 cm (series 2  image 74 and series 4 image 51). No significant surrounding  inflammatory fat stranding. The margins of this masslike lesion appear  fairly well marginated.    The mass causes marked rightward deviation of the oral pharynx and  trachea and narrowing of the inferior trachea. The left common carotid  artery is splayed along the lateral aspect of this mass. The mass  extends inferiorly into the upper mediastinum and abuts the right  aspect of the trachea and esophagus. Posteriorly, the mass abuts the  anterior aspect of the vertebral bodies although there is no lytic or  sclerotic changes of the vertebral bodies.    There is a cluster of enlarged right level IV lymph nodes measuring  1.0 x 1.3 x 1.4 cm and 0.6 x 0.7 x 0.8 cm (coronal image 61 and 64).  Mild adjacent inflammatory fat stranding is seen surrounding the right  scalene muscles which is nonspecific and presumably reactive.    The submandibular and parotid glands are unremarkable. No definite  mucosal space masses. Moderate mucosal thickening in the visualized  paranasal sinuses. Visualized intracranial structures are  unremarkable.    Bilateral pleural effusions right greater than left better described  on chest CT. Patchy consolidation in the left upper lobe also better  described on chest CT.    Multilevel degenerative changes in the spine. Mild-to-moderate spinal  canal narrowing at C5-C6 due to posterior disc osteophyte complex.      Impression    IMPRESSION:     1. Markedly enlarged heterogeneous mass within the left neck that  appears to be centered in the left thyroid lobe. There is a cystic  component within the masslike expansion that contains a fluid/fluid  layer. This could represent hemorrhage with a hematocrit layer  possibly  within a thyroid goiter. Underlying thyroid neoplasm cannot  be excluded. The cystic collection may also represent a cystic thyroid  neoplasm although this would be atypical given the apparent abrupt  appearance per the provided clinical history. The borders of the mass  are fairly well-defined suggesting that this does not represent a  high-grade anaplastic thyroid tumor although a low-grade tumor again  is not excluded. No significant surrounding inflammation to suggest  thyroid abscess.  2. Enlarged cluster of right level IV lymph nodes with mild adjacent  inflammation. These are nonspecific and could be reactive or  neoplastic.  3. The large masslike lesion causes rightward deviation of the  oropharynx and trachea and narrowing of the trachea.  4. Bilateral pleural effusions and left upper lobe opacity better  described on chest CT.      HUNTER NAVARRETE MD       I personally reviewed no images or EKG's today.    Annette Barbour [facility] [place]

## 2018-01-25 NOTE — ED AVS SNAPSHOT
[facility] Emergency Department    [address] [address]    Phone:  [phone]    Fax:  [phone]                                       Ms. Francisco Mason   MRN: [id number]    Department:  [facility] Emergency Department   Date of Visit:  1/25/2018           After Visit Summary Signature Page     I have received my discharge instructions, and my questions have been answered. I have discussed any challenges I see with this plan with the nurse or doctor.    ..........................................................................................................................................  Patient/Patient Representative Signature      ..........................................................................................................................................  Patient Representative Print Name and Relationship to Patient    ..................................................               ................................................  Date                                            Time    ..........................................................................................................................................  Reviewed by Signature/Title    ...................................................              ..............................................  Date                                                            Time

## 2021-06-02 ENCOUNTER — RECORDS - HEALTHEAST (OUTPATIENT)
Dept: ADMINISTRATIVE | Facility: CLINIC | Age: 86
End: 2021-06-02